# Patient Record
Sex: FEMALE | Race: WHITE | NOT HISPANIC OR LATINO | Employment: FULL TIME | URBAN - METROPOLITAN AREA
[De-identification: names, ages, dates, MRNs, and addresses within clinical notes are randomized per-mention and may not be internally consistent; named-entity substitution may affect disease eponyms.]

---

## 2020-05-06 ENCOUNTER — VIRTUAL VISIT (OUTPATIENT)
Dept: FAMILY MEDICINE | Facility: CLINIC | Age: 25
End: 2020-05-06
Payer: COMMERCIAL

## 2020-05-06 VITALS — HEIGHT: 67 IN | BODY MASS INDEX: 22.76 KG/M2 | WEIGHT: 145 LBS

## 2020-05-06 DIAGNOSIS — L70.9 ACNE, UNSPECIFIED ACNE TYPE: ICD-10-CM

## 2020-05-06 DIAGNOSIS — N92.6 IRREGULAR MENSES: Primary | ICD-10-CM

## 2020-05-06 DIAGNOSIS — L68.9 EXCESSIVE HAIR GROWTH: ICD-10-CM

## 2020-05-06 PROCEDURE — 99204 OFFICE O/P NEW MOD 45 MIN: CPT | Mod: 95 | Performed by: FAMILY MEDICINE

## 2020-05-06 RX ORDER — ETONOGESTREL AND ETHINYL ESTRADIOL VAGINAL RING .015; .12 MG/D; MG/D
RING VAGINAL
COMMUNITY
Start: 2020-04-19 | End: 2022-08-09

## 2020-05-06 ASSESSMENT — MIFFLIN-ST. JEOR: SCORE: 1440.35

## 2020-05-06 NOTE — PATIENT INSTRUCTIONS
Schedule fasting lab visit .        At [facility], we strive to deliver an exceptional experience to you, every time we see you. If you receive a survey, please complete it as we do value your feedback.  If you have MyChart, you can expect to receive results automatically within 24 hours of their completion.  Your provider will send a note interpreting your results as well.   If you do not have MyChart, you should receive your results in about a week by mail.    Your care team:                            Family Medicine Internal Medicine   MD Sameer Carlton MD Shantel Branch-Fleming, MD Katya Georgiev PA-C Megan Hill, APRN ADRIAN Flores, MD Pediatrics   NANCY Carrasco, MD Mayda Blackwell APRN CNP   MD Brandy Bruce MD Deborah Mielke, MD Kim Thein, APRN [facility]      Clinic hours: Monday - Thursday 7 am-7 pm; Fridays 7 am-5 pm.   Urgent care: Monday - Friday 11 am-9 pm; Saturday and Sunday 9 am-5 pm.    Clinic: [phone]       [facility]: Monday - Thursday 8 am - 7 pm; Friday 8 am - 6 pm  [facility]: [phone]     Use [email or web address] for 24/7 diagnosis and treatment of dozens of conditions.

## 2020-05-06 NOTE — PROGRESS NOTES
"Laura Nolan is a 25 year old female who is being evaluated via a billable telephone visit.      The patient has been notified of following:     \"This telephone visit will be conducted via a call between you and your physician/provider. We have found that certain health care needs can be provided without the need for a physical exam.  This service lets us provide the care you need with a short phone conversation.  If a prescription is necessary we can send it directly to your pharmacy.  If lab work is needed we can place an order for that and you can then stop by our lab to have the test done at a later time.    Telephone visits are billed at different rates depending on your insurance coverage. During this emergency period, for some insurers they may be billed the same as an in-person visit.  Please reach out to your insurance provider with any questions.    If during the course of the call the physician/provider feels a telephone visit is not appropriate, you will not be charged for this service.\"    Patient has given verbal consent for Telephone visit?  Yes    What phone number would you like to be contacted at? [phone]    How would you like to obtain your AVS? Declined     Subjective     Laura Nolan is a 25 year old female who presents to clinic today for the following health issues:    HPI  New Patient/Transfer of Care    Moved recently and need to establish primary care provider.     On ocp since HS but Was off for 6 monthsl ast year due to insurance.    No menses for 4-5 months when first stopped  Also noting other concerns.  More acne. Increased body/facial hair. Jaw line/neck hair   Thinks she had mostly regular cycles prior to starting ocp as teen  Started ocp due to heavy flow.    Not currently sexually active.     5/7'\", 145lbs  No pg in past.   No family history of gyn issues    Eat well, exercise regularly  Stress is high  No new vision changes   Headaches: noting occasional headache, " "mild brief. Resolve after drinking water/rest.  No new bowel changes.     Due for cpe. Thinks pap in last 5 years     Restarted nuvaring about 3 weeks ago.         There is no problem list on file for this patient.    History reviewed. No pertinent surgical history.    Social History     Tobacco Use     Smoking status: Never Smoker     Smokeless tobacco: Never Used   Substance Use Topics     Alcohol use: Yes     Family History   Problem Relation Age of Onset     Diabetes Paternal Grandmother          Current Outpatient Medications   Medication Sig Dispense Refill     etonogestrel-ethinyl estradiol (NUVARING) 0.12-0.015 MG/24HR vaginal ring        No Known Allergies  No lab results found.     Reviewed and updated as needed this visit by Provider         Review of Systems   ROS COMP: Constitutional, HEENT, cardiovascular, pulmonary, gi and gu systems are negative, except as otherwise noted.       Objective   Reported vitals:  Ht 1.702 m (5' 7\")   Wt 65.8 kg (145 lb)   BMI 22.71 kg/m     healthy, alert and no distress  PSYCH: Alert and oriented times 3; coherent speech, normal   rate and volume, able to articulate logical thoughts, able   to abstract reason, no tangential thoughts, no hallucinations   or delusions  Her affect is normal  RESP: No cough, no audible wheezing, able to talk in full sentences  Remainder of exam unable to be completed due to telephone visits    Diagnostic Test Results:  none         Assessment/Plan:  .     ICD-10-CM    1. Irregular menses  N92.6 etonogestrel-ethinyl estradiol (NUVARING) 0.12-0.015 MG/24HR vaginal ring     TSH with free T4 reflex     Glucose     DHEA sulfate     Prolactin     Testosterone total   2. Excessive hair growth  L68.9 etonogestrel-ethinyl estradiol (NUVARING) 0.12-0.015 MG/24HR vaginal ring     TSH with free T4 reflex     Glucose     DHEA sulfate     Prolactin     Testosterone total   3. Acne, unspecified acne type  L70.9 etonogestrel-ethinyl estradiol (NUVARING) " 0.12-0.015 MG/24HR vaginal ring     TSH with free T4 reflex     Glucose     DHEA sulfate     Prolactin     Testosterone total     Unclear if underlying metabolic d/o contributing  ?pcos vs other.   Will have her come in for labs (she is on hormonal contraception right now and ideally doesn't want to stop if not needed. Agree that we can r/o more serious issues, but pcos harder to dx without stopping meds. She is agreeable to get above labs to begin with  Will have her f/u in summer/fall for cpe and prn. She is able to get refills from past clinic for the nuvaring for now.     Return in about 3 months (around 8/6/2020) for Physical Exam.      Phone call duration:  22 minutes    Autumn Reeder MD

## 2020-05-11 ENCOUNTER — TELEPHONE (OUTPATIENT)
Dept: FAMILY MEDICINE | Facility: CLINIC | Age: 25
End: 2020-05-11

## 2020-05-11 NOTE — TELEPHONE ENCOUNTER
.Reason for call:  Other   Patient called regarding (reason for call): call back  Additional comments: pt wants to know how long she should fast for labs    Phone number to reach patient:  Home number on file [phone] (home)    Best Time:  anytime    Can we leave a detailed message on this number?  YES    Travel screening: Negative

## 2020-05-14 DIAGNOSIS — L70.9 ACNE, UNSPECIFIED ACNE TYPE: ICD-10-CM

## 2020-05-14 DIAGNOSIS — N92.6 IRREGULAR MENSES: ICD-10-CM

## 2020-05-14 DIAGNOSIS — L68.9 EXCESSIVE HAIR GROWTH: ICD-10-CM

## 2020-05-14 LAB
GLUCOSE SERPL-MCNC: 79 MG/DL (ref 70–99)
TSH SERPL DL<=0.005 MIU/L-ACNC: 1.21 MU/L (ref 0.4–4)

## 2020-05-14 PROCEDURE — 84146 ASSAY OF PROLACTIN: CPT | Performed by: FAMILY MEDICINE

## 2020-05-14 PROCEDURE — 82627 DEHYDROEPIANDROSTERONE: CPT | Performed by: FAMILY MEDICINE

## 2020-05-14 PROCEDURE — 84403 ASSAY OF TOTAL TESTOSTERONE: CPT | Performed by: FAMILY MEDICINE

## 2020-05-14 PROCEDURE — 36415 COLL VENOUS BLD VENIPUNCTURE: CPT | Performed by: FAMILY MEDICINE

## 2020-05-14 PROCEDURE — 82947 ASSAY GLUCOSE BLOOD QUANT: CPT | Performed by: FAMILY MEDICINE

## 2020-05-14 PROCEDURE — 84443 ASSAY THYROID STIM HORMONE: CPT | Performed by: FAMILY MEDICINE

## 2020-05-15 LAB — PROLACTIN SERPL-MCNC: 4 UG/L (ref 3–27)

## 2020-05-16 LAB — TESTOST SERPL-MCNC: 81 NG/DL (ref 8–60)

## 2020-05-18 DIAGNOSIS — N92.6 IRREGULAR MENSES: ICD-10-CM

## 2020-05-18 DIAGNOSIS — R79.89 ELEVATED TESTOSTERONE LEVEL IN FEMALE: Primary | ICD-10-CM

## 2020-05-18 LAB — DHEA-S SERPL-MCNC: 222 UG/DL (ref 35–430)

## 2020-05-19 ENCOUNTER — MYC MEDICAL ADVICE (OUTPATIENT)
Dept: FAMILY MEDICINE | Facility: CLINIC | Age: 25
End: 2020-05-19

## 2020-05-19 NOTE — TELEPHONE ENCOUNTER
Routing to provider to please advise. Please see [employer] message below.    Good Morning,     I received some test results back around hormone tests that were conducted last week and then today got a call about a referral about scheduling an endocrinology appointment. I can see that I am out of the normal ranges for some of the tests. Would someone be able to talk me through results or do I need to schedule a follow up appointment for that.     Thank you,     Laura Betancourt RN  [facility]

## 2020-05-19 NOTE — TELEPHONE ENCOUNTER
RECORDS RECEIVED FROM: Internal   DATE RECEIVED: 5.27.2020   NOTES (FOR ALL VISITS) STATUS DETAILS   OFFICE NOTES from referring provider Internal 5.19.2020  ESTER Reeder  5.6.2020   OFFICE NOTES from other specialist N/A    ED NOTES N/A    OPERATIVE REPORT  (thyroid, pituitary, adrenal, parathyroid) N/A    MEDICATION LIST Internal    IMAGING      DEXASCAN N/A    MRI (BRAIN) N/A    XR (Chest) N/A    CT (HEAD/NECK/CHEST/ABDOMEN) N/A    NUCLEAR  N/A    ULTRASOUND (HEAD/NECK) N/A    LABS     DIABETES: HBGA1C, CREATININE, FASTING LIPIDS, MICROALBUMIN URINE, POTASSIUM, TSH, T4    THYROID: TSH, T4, CBC, THYRODLONULIN, TOTAL T3, FREE T4, CALCITONIN, CEA Internal

## 2020-05-26 NOTE — PROGRESS NOTES
"Laura Nolan is a 25 year old female who is being evaluated via a billable telephone visit.      The patient has been notified of following:     \"This telephone visit will be conducted via a call between you and your physician/provider. We have found that certain health care needs can be provided without the need for a physical exam.  This service lets us provide the care you need with a short phone conversation.  If a prescription is necessary we can send it directly to your pharmacy.  If lab work is needed we can place an order for that and you can then stop by our lab to have the test done at a later time.    Telephone visits are billed at different rates depending on your insurance coverage. During this emergency period, for some insurers they may be billed the same as an in-person visit.  Please reach out to your insurance provider with any questions.    If during the course of the call the physician/provider feels a telephone visit is not appropriate, you will not be charged for this service.\"    Patient has given verbal consent for Telephone visit?  Yes    What phone number would you like to be contacted at? [phone]    How would you like to obtain your AVS? [facility]    Endocrinology Telephone/virtual Visit    Chief Complaint: Telephone (endocrine )     Information obtained from:Patient    Subjective:         HPI: Laura Nolan is a 25 year old female with history of irregular menses and more recently amenorrhea and hirsutism who is seen in consultation at Autumn Delacruz*'s request for the same.      This is a 25-year-old female patient who was recently evaluated at her primary care office for amenorrhea and hirsutism and work-up for this issues showed a testosterone level which was elevated and this is why she was sent to endocrinology clinic.      Menarche was at the age of 14 and diabetes age of 13-15 her menstrual cycle was irregular.  However between the age of [age]; she reports that " she had regular menses more or less.  Starting from the age of 18 she started hormonal contraceptive initially due to heavy menses.  She has been on hormone contraceptive pretty much constantly up until 5-month ago when she took herself off of hormonal contraception.  Following that her menses do not resume for a total of 5 months.  She also noted increased facial hair growth described as a couple of hairs on each jawline/on each side and also a couple of hairs on her neck and she also noted increased acne.  She reports that she has minimal hair involving her abdominal and chest area which is pretty much unchanged since stopping the oral contraceptive.  Again, the only hair changes she is noted is few hair on each side of her jawline and neck since stopping the oral contraceptive 5 months ago.    [place]  background and Jehovah's witness on one side of the family.  No family history of hirsutism or poor sick ovarian syndrome.  She  currently weighs about 145 pounds and there is no increase in her weight lately.  In fact, she reported that her weight has been stable more or less over the years.  No stigmata of Cushing's syndrome including diagnosis of diabetes or hypertension.  No new stretch marks.  She does not have any known past medical history.    Few weeks ago she started back hormonal contraception.  She is currently on NuvaRing.  After she has been on NuvaRing for about 3 weeks she had a blood draw for a total testosterone level which was documented below slightly elevated.         No Known Allergies    Current Outpatient Medications   Medication Sig Dispense Refill     etonogestrel-ethinyl estradiol (NUVARING) 0.12-0.015 MG/24HR vaginal ring          Review of Systems     11 point review system (Constitutional, HENT, Eyes, Respiratory, Cardiovascular, Gastrointestinal, Genitourinary, Musculoskeletal,Neurological, Psychiatric/Behavioural, Endocrine) is negative or is as per HPI above    No past medical history  on file.    Past Surgical History:   Procedure Laterality Date     C ORAL SURGERY PROCEDURE  age 16?    wisdom teeth       Family History   Problem Relation Age of Onset     Diabetes Paternal Grandmother        Social History     Socioeconomic History     Marital status: Single     Spouse name: Not on file     Number of children: Not on file     Years of education: Not on file     Highest education level: Not on file   Occupational History     Not on file   Social Needs     Financial resource strain: Not on file     Food insecurity     Worry: Not on file     Inability: Not on file     Transportation needs     Medical: Not on file     Non-medical: Not on file   Tobacco Use     Smoking status: Never Smoker     Smokeless tobacco: Never Used   Substance and Sexual Activity     Alcohol use: Yes     Comment: 4 drinks per week.      Drug use: Never     Sexual activity: Yes     Partners: Male   Lifestyle     Physical activity     Days per week: Not on file     Minutes per session: Not on file     Stress: Not on file   Relationships     Social connections     Talks on phone: Not on file     Gets together: Not on file     Attends Jehovah's witness service: Not on file     Active member of club or organization: Not on file     Attends meetings of clubs or organizations: Not on file     Relationship status: Not on file     Intimate partner violence     Fear of current or ex partner: Not on file     Emotionally abused: Not on file     Physically abused: Not on file     Forced sexual activity: Not on file   Other Topics Concern     Not on file   Social History Narrative     Not on file       Objective:     In House Labs:       TSH   Date Value Ref Range Status   05/14/2020 1.21 0.40 - 4.00 mU/L Final     Assessment/Treatment Plan:      Polycystic ovarian syndrome: Symptoms of irregular menses/amenorrhea and hyperandrogenism symptoms including hirsutism and acne.  No virilization.  Her symptoms based on the biochemical assessment supports  the diagnosis of polycystic ovarian syndrome.  Positive ovarian syndrome is a diagnosis of exclusion and DHEA-sulfate and total testosterone levels which are not significantly elevated rule out ovarian or adrenal pathologist.  Prolactin level was within the normal limits.  Congenital adrenal hyperplasia is in the differential diagnosis given her background and to rule out this 17 hydroxyprogesterone is ordered.  If 17 hydroxyprogesterone comes back within the normal limits; I think the hyperandrogenism symptoms, irregular menses and slightly elevated total testosterone level supports the diagnosis of polycystic ovarian syndrome.  Discussed treatment of positive ovarian syndrome and first-line therapy is hormonal contraception use.  Ideally oral contraceptive with low androgenic property progesterone however she is currently on NuvaRing; which has worked with in the past for her.  She has been on NuvaRing for so many years and the fact that her symptoms worsened up after she stopped this hormonal therapy also supports for secondary syndrome.  The testosterone level was checked after she was on the NuvaRing(about 3 weeks) therefore will repeat testosterone level to make sure that it is moving the right direction/or rather remains a stable.  The following patient education information was shared with the patient via [email or web address].    Patient Instructions     Patient Education     Polycystic Ovary Syndrome  Polycystic ovary syndrome (PCOS) causes harmless, small cysts in the ovaries and other symptoms. PCOS is caused by certain hormones being out of balance. The word  syndrome  means a group of symptoms. Women with PCOS may have no periods, irregular periods, or very long periods.  Your ovaries  Women store their eggs in their ovaries. Each egg is in a capsule called a follicle. Normally during the reproductive years, one follicle grows to produce a mature egg each month. This egg is released during ovulation and the follicle  dissolves.  Hormones out of balance  With polycystic ovary syndrome (PCOS), the hormones that control ovulation are out of balance. These include estrogen, progesterone, and androgen. As a result, ovulation may not occur. Instead, the follicle stays enlarged. This is a fluid-filled sac called a cyst. Over time, the ovaries fill with many small cysts. This is why they are called  poly  or many  cystic  ovaries. In some women, the ovaries also make too much androgen.  Women with PCOS may also have one or more of these symptoms:    Acne    Hair growth on the face and other parts of the body    Patches of thickened, velvety, darkened skin called acanthosis nigricans    Trouble getting pregnant (fertility problems)    Weight gain, especially around the waist       Women with PCOS are also at increased risk of developing cancer of the uterine lining (which is effectively prevented by hormonal contraception you are currently taking and diabetes(which is monitored closely).        PCOS is a diagnosis of exclusion and in order to make a diagnosis; other medical conditions which cause similar symptoms have to be ruled out. With this in mind,  I have ordered 17 hydroxyprogesterone to rule out a condition known as congenital adrenal hyperplasia.      I will contact you after the blood work results.  In the meantime if you have any question please do not hesitate to contact me.      I will contact the patient with the test results.  Return to clinic in 3-4 months.    Test and/or medications prescribed today:  Orders Placed This Encounter   Procedures     Testosterone total     17 OH progesterone     Hemoglobin A1c         Blanche Mi MD  Staff Endocrinologist    [phone]  Division of Endocrinology and Diabetes          Phone call duration: 28 minutes (Consult level IV)

## 2020-05-27 ENCOUNTER — PRE VISIT (OUTPATIENT)
Dept: ENDOCRINOLOGY | Facility: CLINIC | Age: 25
End: 2020-05-27

## 2020-05-27 ENCOUNTER — VIRTUAL VISIT (OUTPATIENT)
Dept: ENDOCRINOLOGY | Facility: CLINIC | Age: 25
End: 2020-05-27
Payer: COMMERCIAL

## 2020-05-27 DIAGNOSIS — L68.0 HIRSUTISM: ICD-10-CM

## 2020-05-27 DIAGNOSIS — E28.2 PCOS (POLYCYSTIC OVARIAN SYNDROME): Primary | ICD-10-CM

## 2020-05-27 DIAGNOSIS — N92.6 IRREGULAR MENSTRUAL CYCLE: ICD-10-CM

## 2020-05-27 NOTE — LETTER
"5/27/2020       RE: Laura Nolan  [address] [facility] [address]     Dear Colleague,    Thank you for referring your patient, Laura Nolan, to the [facility] ENDOCRINOLOGY at [facility]. Please see a copy of my visit note below.    Laura Nolan is a 25 year old female who is being evaluated via a billable telephone visit.      The patient has been notified of following:     \"This telephone visit will be conducted via a call between you and your physician/provider. We have found that certain health care needs can be provided without the need for a physical exam.  This service lets us provide the care you need with a short phone conversation.  If a prescription is necessary we can send it directly to your pharmacy.  If lab work is needed we can place an order for that and you can then stop by our lab to have the test done at a later time.    Telephone visits are billed at different rates depending on your insurance coverage. During this emergency period, for some insurers they may be billed the same as an in-person visit.  Please reach out to your insurance provider with any questions.    If during the course of the call the physician/provider feels a telephone visit is not appropriate, you will not be charged for this service.\"    Patient has given verbal consent for Telephone visit?  Yes    What phone number would you like to be contacted at? [phone]    How would you like to obtain your AVS? [facility]    Endocrinology Telephone/virtual Visit    Chief Complaint: Telephone (endocrine )     Information obtained from:Patient    Subjective:         HPI: Laura Nolan is a 25 year old female with history of irregular menses and more recently amenorrhea and hirsutism who is seen in consultation at Autumn Delacruz*'s request for the same.      This is a 25-year-old female patient who was recently evaluated at her primary care office for amenorrhea and " hirsutism and work-up for this issues showed a testosterone level which was elevated and this is why she was sent to endocrinology clinic.      Menarche was at the age of 14 and diabetes age of 13-15 her menstrual cycle was irregular.  However between the age of [age]; she reports that she had regular menses more or less.  Starting from the age of 18 she started hormonal contraceptive initially due to heavy menses.  She has been on hormone contraceptive pretty much constantly up until 5-month ago when she took herself off of hormonal contraception.  Following that her menses do not resume for a total of 5 months.  She also noted increased facial hair growth described as a couple of hairs on each jawline/on each side and also a couple of hairs on her neck and she also noted increased acne.  She reports that she has minimal hair involving her abdominal and chest area which is pretty much unchanged since stopping the oral contraceptive.  Again, the only hair changes she is noted is few hair on each side of her jawline and neck since stopping the oral contraceptive 5 months ago.    Eastern  background and Baptism on one side of the family.  No family history of hirsutism or poor sick ovarian syndrome.  She  currently weighs about 145 pounds and there is no increase in her weight lately.  In fact, she reported that her weight has been stable more or less over the years.  No stigmata of Cushing's syndrome including diagnosis of diabetes or hypertension.  No new stretch marks.  She does not have any known past medical history.    Few weeks ago she started back hormonal contraception.  She is currently on NuvaRing.  After she has been on NuvaRing for about 3 weeks she had a blood draw for a total testosterone level which was documented below slightly elevated.         No Known Allergies    Current Outpatient Medications   Medication Sig Dispense Refill     etonogestrel-ethinyl estradiol (NUVARING) 0.12-0.015 MG/24HR  vaginal ring          Review of Systems     11 point review system (Constitutional, HENT, Eyes, Respiratory, Cardiovascular, Gastrointestinal, Genitourinary, Musculoskeletal,Neurological, Psychiatric/Behavioural, Endocrine) is negative or is as per HPI above    No past medical history on file.    Past Surgical History:   Procedure Laterality Date     C ORAL SURGERY PROCEDURE  age 16?    wisdom teeth       Family History   Problem Relation Age of Onset     Diabetes Paternal Grandmother        Social History     Socioeconomic History     Marital status: Single     Spouse name: Not on file     Number of children: Not on file     Years of education: Not on file     Highest education level: Not on file   Occupational History     Not on file   Social Needs     Financial resource strain: Not on file     Food insecurity     Worry: Not on file     Inability: Not on file     Transportation needs     Medical: Not on file     Non-medical: Not on file   Tobacco Use     Smoking status: Never Smoker     Smokeless tobacco: Never Used   Substance and Sexual Activity     Alcohol use: Yes     Comment: 4 drinks per week.      Drug use: Never     Sexual activity: Yes     Partners: Male   Lifestyle     Physical activity     Days per week: Not on file     Minutes per session: Not on file     Stress: Not on file   Relationships     Social connections     Talks on phone: Not on file     Gets together: Not on file     Attends [employer] service: Not on file     Active member of club or organization: Not on file     Attends meetings of clubs or organizations: Not on file     Relationship status: Not on file     Intimate partner violence     Fear of current or ex partner: Not on file     Emotionally abused: Not on file     Physically abused: Not on file     Forced sexual activity: Not on file   Other Topics Concern     Not on file   Social History Narrative     Not on file       Objective:     In House Labs:       TSH   Date Value Ref Range  Status   05/14/2020 1.21 0.40 - 4.00 mU/L Final     Assessment/Treatment Plan:      Polycystic ovarian syndrome: Symptoms of irregular menses/amenorrhea and hyperandrogenism symptoms including hirsutism and acne.  No virilization.  Her symptoms based on the biochemical assessment supports the diagnosis of polycystic ovarian syndrome.  Positive ovarian syndrome is a diagnosis of exclusion and DHEA-sulfate and total testosterone levels which are not significantly elevated rule out ovarian or adrenal pathologist.  Prolactin level was within the normal limits.  Congenital adrenal hyperplasia is in the differential diagnosis given her background and to rule out this 17 hydroxyprogesterone is ordered.  If 17 hydroxyprogesterone comes back within the normal limits; I think the hyperandrogenism symptoms, irregular menses and slightly elevated total testosterone level supports the diagnosis of polycystic ovarian syndrome.  Discussed treatment of positive ovarian syndrome and first-line therapy is hormonal contraception use.  Ideally oral contraceptive with low androgenic property progesterone however she is currently on NuvaRing; which has worked with in the past for her.  She has been on NuvaRing for so many years and the fact that her symptoms worsened up after she stopped this hormonal therapy also supports for secondary syndrome.  The testosterone level was checked after she was on the NuvaRing(about 3 weeks) therefore will repeat testosterone level to make sure that it is moving the right direction/or rather remains a stable.  The following patient education information was shared with the patient via [employer].    Patient Instructions     Patient Education     Polycystic Ovary Syndrome  Polycystic ovary syndrome (PCOS) causes harmless, small cysts in the ovaries and other symptoms. PCOS is caused by certain hormones being out of balance. The word  syndrome  means a group of symptoms. Women with PCOS may have no periods,  irregular periods, or very long periods.  Your ovaries  Women store their eggs in their ovaries. Each egg is in a capsule called a follicle. Normally during the reproductive years, one follicle grows to produce a mature egg each month. This egg is released during ovulation and the follicle dissolves.  Hormones out of balance  With polycystic ovary syndrome (PCOS), the hormones that control ovulation are out of balance. These include estrogen, progesterone, and androgen. As a result, ovulation may not occur. Instead, the follicle stays enlarged. This is a fluid-filled sac called a cyst. Over time, the ovaries fill with many small cysts. This is why they are called  poly  or many  cystic  ovaries. In some women, the ovaries also make too much androgen.  Women with PCOS may also have one or more of these symptoms:    Acne    Hair growth on the face and other parts of the body    Patches of thickened, velvety, darkened skin called acanthosis nigricans    Trouble getting pregnant (fertility problems)    Weight gain, especially around the waist       Women with PCOS are also at increased risk of developing cancer of the uterine lining (which is effectively prevented by hormonal contraception you are currently taking and diabetes(which is monitored closely).        PCOS is a diagnosis of exclusion and in order to make a diagnosis; other medical conditions which cause similar symptoms have to be ruled out. With this in mind,  I have ordered 17 hydroxyprogesterone to rule out a condition known as congenital adrenal hyperplasia.      I will contact you after the blood work results.  In the meantime if you have any question please do not hesitate to contact me.      I will contact the patient with the test results.  Return to clinic in 3-4 months.    Test and/or medications prescribed today:  Orders Placed This Encounter   Procedures     Testosterone total     17 OH progesterone     Hemoglobin A1c         Blanche Mi  MD  Staff Endocrinologist    [phone]  Division of Endocrinology and Diabetes          Phone call duration: 28 minutes (Consult level IV)        Again, thank you for allowing me to participate in the care of your patient.      Sincerely,    Blanche Mi MD

## 2020-05-27 NOTE — PATIENT INSTRUCTIONS
Patient Education     Polycystic Ovary Syndrome  Polycystic ovary syndrome (PCOS) causes harmless, small cysts in the ovaries and other symptoms. PCOS is caused by certain hormones being out of balance. The word  syndrome  means a group of symptoms. Women with PCOS may have no periods, irregular periods, or very long periods.  Your ovaries  Women store their eggs in their ovaries. Each egg is in a capsule called a follicle. Normally during the reproductive years, one follicle grows to produce a mature egg each month. This egg is released during ovulation and the follicle dissolves.  Hormones out of balance  With polycystic ovary syndrome (PCOS), the hormones that control ovulation are out of balance. These include estrogen, progesterone, and androgen. As a result, ovulation may not occur. Instead, the follicle stays enlarged. This is a fluid-filled sac called a cyst. Over time, the ovaries fill with many small cysts. This is why they are called  poly  or many  cystic  ovaries. In some women, the ovaries also make too much androgen.  Women with PCOS may also have one or more of these symptoms:    Acne    Hair growth on the face and other parts of the body    Patches of thickened, velvety, darkened skin called acanthosis nigricans    Trouble getting pregnant (fertility problems)    Weight gain, especially around the waist       Women with PCOS are also at increased risk of developing cancer of the uterine lining (which is effectively prevented by hormonal contraception you are currently taking and diabetes(which is monitored closely).        PCOS is a diagnosis of exclusion and in order to make a diagnosis; other medical conditions which cause similar symptoms have to be ruled out. With this in mind,  I have ordered 17 hydroxyprogesterone to rule out a condition known as congenital adrenal hyperplasia.      I will contact you after the blood work results.  In the meantime if you have any question please do not  hesitate to contact me.

## 2020-09-04 ENCOUNTER — MYC MEDICAL ADVICE (OUTPATIENT)
Dept: FAMILY MEDICINE | Facility: CLINIC | Age: 25
End: 2020-09-04

## 2020-09-23 ASSESSMENT — ENCOUNTER SYMPTOMS
FEVER: 0
DIARRHEA: 0
PARESTHESIAS: 0
ARTHRALGIAS: 0
MYALGIAS: 0
BREAST MASS: 0
JOINT SWELLING: 0
SHORTNESS OF BREATH: 0
EYE PAIN: 0
HEMATOCHEZIA: 0
WEAKNESS: 0
NAUSEA: 0
ABDOMINAL PAIN: 0
NERVOUS/ANXIOUS: 1
HEADACHES: 0
FREQUENCY: 0
CHILLS: 0
CONSTIPATION: 0
HEARTBURN: 0
SORE THROAT: 0
COUGH: 0
HEMATURIA: 0
PALPITATIONS: 0
DYSURIA: 0
DIZZINESS: 0

## 2020-09-24 ENCOUNTER — OFFICE VISIT (OUTPATIENT)
Dept: FAMILY MEDICINE | Facility: CLINIC | Age: 25
End: 2020-09-24
Payer: COMMERCIAL

## 2020-09-24 VITALS
SYSTOLIC BLOOD PRESSURE: 106 MMHG | TEMPERATURE: 98.1 F | HEART RATE: 65 BPM | OXYGEN SATURATION: 100 % | DIASTOLIC BLOOD PRESSURE: 64 MMHG | BODY MASS INDEX: 23.49 KG/M2 | WEIGHT: 150 LBS

## 2020-09-24 DIAGNOSIS — Z11.3 SCREENING FOR STDS (SEXUALLY TRANSMITTED DISEASES): ICD-10-CM

## 2020-09-24 DIAGNOSIS — Z30.44 ENCOUNTER FOR SURVEILLANCE OF VAGINAL RING HORMONAL CONTRACEPTIVE DEVICE: ICD-10-CM

## 2020-09-24 DIAGNOSIS — Z11.4 SCREENING FOR HIV (HUMAN IMMUNODEFICIENCY VIRUS): ICD-10-CM

## 2020-09-24 DIAGNOSIS — Z00.00 ROUTINE GENERAL MEDICAL EXAMINATION AT A HEALTH CARE FACILITY: Primary | ICD-10-CM

## 2020-09-24 DIAGNOSIS — E28.2 PCOS (POLYCYSTIC OVARIAN SYNDROME): ICD-10-CM

## 2020-09-24 DIAGNOSIS — Z12.4 SCREENING FOR MALIGNANT NEOPLASM OF CERVIX: ICD-10-CM

## 2020-09-24 DIAGNOSIS — N92.6 IRREGULAR MENSES: ICD-10-CM

## 2020-09-24 DIAGNOSIS — L70.9 ACNE, UNSPECIFIED ACNE TYPE: ICD-10-CM

## 2020-09-24 DIAGNOSIS — N92.6 IRREGULAR MENSTRUAL CYCLE: ICD-10-CM

## 2020-09-24 DIAGNOSIS — L68.0 HIRSUTISM: ICD-10-CM

## 2020-09-24 DIAGNOSIS — L68.9 EXCESSIVE HAIR GROWTH: ICD-10-CM

## 2020-09-24 LAB — HBA1C MFR BLD: 4.9 % (ref 0–5.6)

## 2020-09-24 PROCEDURE — G0145 SCR C/V CYTO,THINLAYER,RESCR: HCPCS | Performed by: FAMILY MEDICINE

## 2020-09-24 PROCEDURE — 36415 COLL VENOUS BLD VENIPUNCTURE: CPT | Performed by: FAMILY MEDICINE

## 2020-09-24 PROCEDURE — 83036 HEMOGLOBIN GLYCOSYLATED A1C: CPT | Performed by: FAMILY MEDICINE

## 2020-09-24 PROCEDURE — 83498 ASY HYDROXYPROGESTERONE 17-D: CPT | Performed by: FAMILY MEDICINE

## 2020-09-24 PROCEDURE — 87591 N.GONORRHOEAE DNA AMP PROB: CPT | Performed by: FAMILY MEDICINE

## 2020-09-24 PROCEDURE — 84403 ASSAY OF TOTAL TESTOSTERONE: CPT | Performed by: FAMILY MEDICINE

## 2020-09-24 PROCEDURE — 99395 PREV VISIT EST AGE 18-39: CPT | Mod: 25 | Performed by: FAMILY MEDICINE

## 2020-09-24 PROCEDURE — 90472 IMMUNIZATION ADMIN EACH ADD: CPT | Performed by: FAMILY MEDICINE

## 2020-09-24 PROCEDURE — 90715 TDAP VACCINE 7 YRS/> IM: CPT | Performed by: FAMILY MEDICINE

## 2020-09-24 PROCEDURE — 90471 IMMUNIZATION ADMIN: CPT | Performed by: FAMILY MEDICINE

## 2020-09-24 PROCEDURE — 90686 IIV4 VACC NO PRSV 0.5 ML IM: CPT | Performed by: FAMILY MEDICINE

## 2020-09-24 PROCEDURE — 87389 HIV-1 AG W/HIV-1&-2 AB AG IA: CPT | Performed by: FAMILY MEDICINE

## 2020-09-24 PROCEDURE — 87491 CHLMYD TRACH DNA AMP PROBE: CPT | Performed by: FAMILY MEDICINE

## 2020-09-24 RX ORDER — ETONOGESTREL AND ETHINYL ESTRADIOL VAGINAL RING .015; .12 MG/D; MG/D
1 RING VAGINAL
Qty: 3 EACH | Refills: 3 | Status: SHIPPED | OUTPATIENT
Start: 2020-09-24 | End: 2021-09-28

## 2020-09-24 ASSESSMENT — ENCOUNTER SYMPTOMS
SHORTNESS OF BREATH: 0
HEMATURIA: 0
NAUSEA: 0
MYALGIAS: 0
PARESTHESIAS: 0
NERVOUS/ANXIOUS: 1
FEVER: 0
HEADACHES: 0
ABDOMINAL PAIN: 0
HEARTBURN: 0
CONSTIPATION: 0
ARTHRALGIAS: 0
CHILLS: 0
JOINT SWELLING: 0
COUGH: 0
HEMATOCHEZIA: 0
DIZZINESS: 0
WEAKNESS: 0
DYSURIA: 0
SORE THROAT: 0
BREAST MASS: 0
PALPITATIONS: 0
DIARRHEA: 0
EYE PAIN: 0
FREQUENCY: 0

## 2020-09-24 NOTE — PROGRESS NOTES
SUBJECTIVE:   CC: Laura Nolan is an 25 year old woman who presents for preventive health visit.     Healthy Habits:     Getting at least 3 servings of Calcium per day:  Yes    Bi-annual eye exam:  NO    Dental care twice a year:  Yes    Sleep apnea or symptoms of sleep apnea:  None    Diet:  Regular (no restrictions)    Frequency of exercise:  4-5 days/week    Duration of exercise:  30-45 minutes    Taking medications regularly:  No    Barriers to taking medications:  None    Medication side effects:  None    PHQ-2 Total Score: 1    Additional concerns today:  Yes    Getting w/u for pcos with endocrinology and has pending orders that still need to be drawn..   On nuvaring currently  Has been managing her acne better and is no longer so concerned about.  Still abnormal body hair but managing it.     Interested in implant or IUD for contraception    Declines overt depression or anxiety right now. Just covid stress.  Feels she has good resources to reach out to when her stress is high.    Today's PHQ-2 Score:   PHQ-2 ( 1999 Pfizer) 9/23/2020   Q1: Little interest or pleasure in doing things 1   Q2: Feeling down, depressed or hopeless 0   PHQ-2 Score 1   Q1: Little interest or pleasure in doing things Several days   Q2: Feeling down, depressed or hopeless Not at all   PHQ-2 Score 1         Abuse: Current or Past (Physical, Sexual or Emotional) - No  Do you feel safe in your environment? Yes    Reports last pap was 2018    Social History     Tobacco Use     Smoking status: Never Smoker     Smokeless tobacco: Never Used   Substance Use Topics     Alcohol use: Yes     Comment: 4 drinks per week.      If you drink alcohol do you typically have >3 drinks per day or >7 drinks per week? No    Alcohol Use 9/23/2020   Prescreen: >3 drinks/day or >7 drinks/week? No       Reviewed orders with patient.  Reviewed health maintenance and updated orders accordingly - Yes  There is no problem list on file for this patient.     Past Surgical History:   Procedure Laterality Date     C ORAL SURGERY PROCEDURE  age 16?    wisdom teeth       Social History     Tobacco Use     Smoking status: Never Smoker     Smokeless tobacco: Never Used   Substance Use Topics     Alcohol use: Yes     Comment: 4 drinks per week.      Family History   Problem Relation Age of Onset     Diabetes Paternal Grandmother          Current Outpatient Medications   Medication Sig Dispense Refill     etonogestrel-ethinyl estradiol (NUVARING) 0.12-0.015 MG/24HR vaginal ring Place 1 each vaginally every 28 days 3 each 3     etonogestrel-ethinyl estradiol (NUVARING) 0.12-0.015 MG/24HR vaginal ring        No Known Allergies    Mammogram not appropriate for this patient based on age.    Pertinent mammograms are reviewed under the imaging tab.  History of abnormal Pap smear: NO - age 21-29 PAP every 3 years recommended     Reviewed and updated as needed this visit by clinical staff  Tobacco  Allergies  Meds  Problems  Med Hx  Surg Hx  Fam Hx         Reviewed and updated as needed this visit by Provider  Tobacco  Allergies  Meds  Problems  Med Hx  Surg Hx  Fam Hx            Review of Systems   Constitutional: Negative for chills and fever.   HENT: Negative for congestion, ear pain, hearing loss and sore throat.    Eyes: Negative for pain and visual disturbance.   Respiratory: Negative for cough and shortness of breath.    Cardiovascular: Negative for chest pain, palpitations and peripheral edema.   Gastrointestinal: Negative for abdominal pain, constipation, diarrhea, heartburn, hematochezia and nausea.   Breasts:  Negative for tenderness, breast mass and discharge.   Genitourinary: Negative for dysuria, frequency, genital sores, hematuria, pelvic pain, urgency, vaginal bleeding and vaginal discharge.   Musculoskeletal: Negative for arthralgias, joint swelling and myalgias.   Skin: Negative for rash.   Neurological: Negative for dizziness, weakness, headaches and  paresthesias.   Psychiatric/Behavioral: Negative for mood changes. The patient is nervous/anxious.       ROS: 10 point ROS neg other than the symptoms noted above in the HPI.       OBJECTIVE:   /64 (BP Location: Right arm, Patient Position: Chair, Cuff Size: Adult Regular)   Pulse 65   Temp 98.1  F (36.7  C) (Oral)   Wt 68 kg (150 lb)   LMP 09/14/2020 (Approximate)   SpO2 100%   Breastfeeding No   BMI 23.49 kg/m    Physical Exam  GENERAL: healthy, alert and no distress  EYES: Eyes grossly normal to inspection, PERRL and conjunctivae and sclerae normal  HENT: ear canals and TM's normal, nose and mouth without ulcers or lesions  NECK: no adenopathy, no asymmetry, masses, or scars and thyroid normal to palpation  RESP: lungs clear to auscultation - no rales, rhonchi or wheezes  BREAST: normal without masses, tenderness or nipple discharge and no palpable axillary masses or adenopathy  CV: regular rate and rhythm, normal S1 S2, no S3 or S4, no murmur, click or rub, no peripheral edema and peripheral pulses strong  ABDOMEN: soft, nontender, no hepatosplenomegaly, no masses and bowel sounds normal   (female): normal female external genitalia, normal urethral meatus, vaginal mucosa pink, moist, well rugated, and normal cervix/adnexa/uterus without masses or discharge  MS: no gross musculoskeletal defects noted, no edema  SKIN: no suspicious lesions or rashes  NEURO: Normal strength and tone, mentation intact and speech normal  PSYCH: mentation appears normal, affect normal/bright    Diagnostic Test Results:  Labs reviewed in Epic    ASSESSMENT/PLAN:   1. Routine general medical examination at a health care facility  Patient is able to pull up her past MyChart account from her clinic in [place] and she is due for a tetanus vaccine which we will update today.  She has completed all 3 HPV vaccines    2. Encounter for surveillance of vaginal ring hormonal contraceptive device  Reviewed contraception options  "and she would like to think about these for a bit prior to scheduling for IUD or Nexplanon.  For now we will continue NuvaRing.  - etonogestrel-ethinyl estradiol (NUVARING) 0.12-0.015 MG/24HR vaginal ring; Place 1 each vaginally every 28 days  Dispense: 3 each; Refill: 3    3. Screening for STDs (sexually transmitted diseases)  Currently not sexually active and no current symptoms but does agree to screening.  - NEISSERIA GONORRHOEA PCR  - CHLAMYDIA TRACHOMATIS PCR    4. Screening for HIV (human immunodeficiency virus)  Does not think she is ever been screened in the past but feels she is very low risk and she uses condoms.  - **HIV Antigen Antibody Combo FUTURE anytime; Future    5. Screening for malignant neoplasm of cervix  - Pap imaged thin layer screen only - recommended age 21 - 24 years    6. Irregular menses  7. Excessive hair growth  8. Acne, unspecified acne type  Probable PCOS.  Will get additional labs today ordered by endocrine        COUNSELING:  Reviewed preventive health counseling, as reflected in patient instructions       Regular exercise       Healthy diet/nutrition       Contraception       Safe sex practices/STD prevention    Estimated body mass index is 23.49 kg/m  as calculated from the following:    Height as of 5/6/20: 1.702 m (5' 7\").    Weight as of this encounter: 68 kg (150 lb).        She reports that she has never smoked. She has never used smokeless tobacco.      Counseling Resources:  ATP IV Guidelines  Pooled Cohorts Equation Calculator  Breast Cancer Risk Calculator  BRCA-Related Cancer Risk Assessment: FHS-7 Tool  FRAX Risk Assessment  ICSI Preventive Guidelines  Dietary Guidelines for Americans, 2010  USDA's MyPlate  ASA Prophylaxis  Lung CA Screening    Autumn Reeder MD  [facility]  "

## 2020-09-24 NOTE — PATIENT INSTRUCTIONS
Schedule office visit for placement of IUD or Nexplanon       Preventive Health Recommendations  Female Ages 21 to 25     Yearly exam:     See your health care provider every year in order to  o Review health changes.   o Discuss preventive care.    o Review your medicines if your doctor has prescribed any.      You should be tested each year for STDs (sexually transmitted diseases).       Talk to your provider about how often you should have cholesterol testing.      Get a Pap test every three years. If you have an abnormal result, your doctor may have you test more often.      If you are at risk for diabetes, you should have a diabetes test (fasting glucose).     Shots:     Get a flu shot each year.     Get a tetanus shot every 10 years.     Consider getting the shot (vaccine) that prevents cervical cancer (Gardasil).    Nutrition:     Eat at least 5 servings of fruits and vegetables each day.    Eat whole-grain bread, whole-wheat pasta and brown rice instead of white grains and rice.    Get adequate Calcium and Vitamin D.     Lifestyle    Exercise at least 150 minutes a week each week (30 minutes a day, 5 days a week). This will help you control your weight and prevent disease.    Limit alcohol to one drink per day.    No smoking.     Wear sunscreen to prevent skin cancer.    See your dentist every six months for an exam and cleaning.

## 2020-09-24 NOTE — NURSING NOTE
Prior to immunization administration, verified patients identity using patient s name and date of birth. Please see Immunization Activity for additional information.     Screening Questionnaire for Adult Immunization    Are you sick today?   No   Do you have allergies to medications, food, a vaccine component or latex?   No   Have you ever had a serious reaction after receiving a vaccination?   No   Do you have a long-term health problem with heart, lung, kidney, or metabolic disease (e.g., diabetes), asthma, a blood disorder, no spleen, complement component deficiency, a cochlear implant, or a spinal fluid leak?  Are you on long-term aspirin therapy?   No   Do you have cancer, leukemia, HIV/AIDS, or any other immune system problem?   No   Do you have a parent, brother, or sister with an immune system problem?   No   In the past 3 months, have you taken medications that affect  your immune system, such as prednisone, other steroids, or anticancer drugs; drugs for the treatment of rheumatoid arthritis, Crohn s disease, or psoriasis; or have you had radiation treatments?   No   Have you had a seizure, or a brain or other nervous system problem?   No   During the past year, have you received a transfusion of blood or blood    products, or been given immune (gamma) globulin or antiviral drug?   No   For women: Are you pregnant or is there a chance you could become       pregnant during the next month?   No   Have you received any vaccinations in the past 4 weeks?   No     Immunization questionnaire answers were all negative.        Per orders of Dr. Reeder, injection of Tdap and Flu given by Елена Delatorre MA. Patient instructed to remain in clinic for 15 minutes afterwards, and to report any adverse reaction to me immediately.       Screening performed by Елена Delatorre MA on 9/24/2020 at 3:03 PM.

## 2020-09-25 LAB
C TRACH DNA SPEC QL NAA+PROBE: NEGATIVE
HIV 1+2 AB+HIV1 P24 AG SERPL QL IA: NONREACTIVE
N GONORRHOEA DNA SPEC QL NAA+PROBE: NEGATIVE
SPECIMEN SOURCE: NORMAL
SPECIMEN SOURCE: NORMAL

## 2020-09-26 LAB — TESTOST SERPL-MCNC: 48 NG/DL (ref 8–60)

## 2020-09-28 LAB — 17OHP SERPL-MCNC: 29 NG/DL

## 2020-09-29 ENCOUNTER — MYC MEDICAL ADVICE (OUTPATIENT)
Dept: FAMILY MEDICINE | Facility: CLINIC | Age: 25
End: 2020-09-29

## 2020-09-29 LAB
COPATH REPORT: NORMAL
PAP: NORMAL

## 2020-09-30 NOTE — RESULT ENCOUNTER NOTE
Laura,     Tammie please find blood work results.     The testosterone, hemoglobin A1c and 17 hydroxyprogesterone and within the normal range.  Please continue current therapy for polycystic ovarian syndrome.  Please let me know if you have any questions.    Blanche Mi MD

## 2020-12-31 ENCOUNTER — MYC MEDICAL ADVICE (OUTPATIENT)
Dept: FAMILY MEDICINE | Facility: CLINIC | Age: 25
End: 2020-12-31

## 2021-02-08 ENCOUNTER — HOSPITAL ENCOUNTER (OUTPATIENT)
Dept: BEHAVIORAL HEALTH | Facility: CLINIC | Age: 26
Discharge: HOME | End: 2021-02-08
Attending: FAMILY MEDICINE | Admitting: FAMILY MEDICINE
Payer: COMMERCIAL

## 2021-02-08 PROCEDURE — 90791 PSYCH DIAGNOSTIC EVALUATION: CPT | Mod: GT | Performed by: COUNSELOR

## 2021-02-08 NOTE — PROGRESS NOTES
"[facility]  Provider Name:  Deana Ramires, JANES, [facility], [facility] [place]    PATIENT'S NAME: Laura Nolan  PREFERRED NAME: Laura  PRONOUNS:     She/her  MRN: [id number]  : 1995  ADDRESS: [address]  [address] [place] [id number]   [facility]  [id number]  DATE OF SERVICE: 21  START TIME: 4:01pm  END TIME: 4:51pm  PREFERRED PHONE:  [phone]   [email or web address]  - will send support group links.   May we leave a program related message: Yes  SERVICE MODALITY:  Video Visit:      Provider verified identity through the following two step process.  Patient provided:  Patient address    Telemedicine Visit: The patient's condition can be safely assessed and treated via synchronous audio and visual telemedicine encounter.      Reason for Telemedicine Visit: Services only offered telehealth    Originating Site (Patient Location): Patient's home    Distant Site (Provider Location): Provider Remote Setting    Consent:  The patient/guardian has verbally consented to: the potential risks and benefits of telemedicine (video visit) versus in person care; bill my insurance or make self-payment for services provided; and responsibility for payment of non-covered services.     Patient would like the video invitation sent by:  Send to e-mail at: [email or web address]    Mode of Communication:  Video Conference via Amwell    As the provider I attest to compliance with applicable laws and regulations related to telemedicine.    UNIVERSAL ADULT Mental Health DIAGNOSTIC ASSESSMENT    Identifying Information:  Patient is a 26 year old.  The pronoun use throughout this assessment reflects the patient's chosen pronoun. Patient was referred for an assessment by self.  Patient attended the session alone.     Chief Complaint:   The reason for seeking services at this time is: \"I have not been managing mental health the way I want to.\" She has been feeling this way for the past " 4 months. She worked with a therapist 6 years ago for circumstantial issues. This has happened on and off for a couple of years. Her current feelings started about 5 months ago and this is the longest stretch of time that she hasn't been able to manage it on her own. She sleeps okay and tries to keep herself on a schedule because she used to oversleep. She has been losing hope and losing direction. She doesn't know why she is doing things and is not feeling purpose. She is afraid she will wake up in several years and not be living the life she wants. She doesn't know what she wants out of her future. She has talked with family about this. Her mother is supportive, but her parents don't have the answers. She feels unhappy. She his trying to do all the right things - exercising, running, Vitamin D, sunlamp, pursue new hobbies - but doesn't feel better. She thinks she will have this existential crisis even with medications.       Social/Family History:  Patient reported they grew up in [place], [place]. Her parents remain . She has one older brother - 2.5 years older. Patient reported that their childhood was good, great childhood, parents are awesome. Patient denied all forms of abuse. Patient describes current relationships with family of origin as: her main support systems.        The patient describes their cultural background as White and Episcopal.  Cultural influences and impact on patient's life structure, values, norms, and healthcare: Racial or Ethnic Self-Identification.  Contextual influences on patient's health include: None. Patient identified their preferred language to be English. Patient reported they does not need the assistance of an  or other support involved in therapy.     Patient reported had no significant delays in developmental tasks. Patient's highest education level was college graduate. Patient identified the following learning problems: none reported. Modifications will not be  used to assist communication in therapy. Patient reports they are able to understand written materials.    Patient's current relationship status is partnered / significant other for about 6 months. Patient identified their sexual orientation as bi-sexual.  Patient reported having zero child(se).      Patient lives with alone in an apartment. Housing is stable. Patient identified parents and brother as part of their support system.  Patient identified the quality of these relationships as good.     Patient works full-time in [employer] for [employer]. She works from home most days. Patient reports their finances are obtained through employment.  Patient does not identify finances as a current stressor.      Patient reported that they have not been involved with the legal system. Patient denies being on probation / parole / under the jurisdiction of the court.    Patient's Strengths and Limitations:  Patient identified the following strengths or resources that will help them succeed in treatment: commitment to health and well being, insight, intelligence and work ethic. Things that may interfere with the patient's success in treatment include: none identified.     Personal and Family Medical History:   Patient does report a family history of mental health concerns. There might be depression on her mother's side. Patient reports family history includes Diabetes in her paternal grandmother..     Patient reported the following previous diagnoses which include(s): Depression. Patient reported symptoms began for the past few years.  Patient has received mental health services in the past: therapy about 6 years ago.  Psychiatric Hospitalizations: None.  Patient denies a history of civil commitment.  Currently, patient is not receiving other mental health services.     Patient has had a physical exam to rule out medical causes for current symptoms. Date of last physical exam was within the past year. The patient has a [facility]  Primary Care Provider, who is named Autumn Reeder. About 5 months ago, her doctor checked some blood levels and she was diagnosed with polycystic ovarian syndrome. Patient stated for a while it had mental impact on her, but doesn't think it contributes now. She takes Vitamin D supplements and multivitamins. Patient denies any issues with pain. There are not significant appetite / nutritional concerns / weight changes - she stated she makes an effort to eat right and sometimes overeat. She cooks for herself. Patient does not report a history of head injury / trauma / cognitive impairment.      Current Outpatient Medications   Medication     etonogestrel-ethinyl estradiol (NUVARING) 0.12-0.015 MG/24HR vaginal ring     etonogestrel-ethinyl estradiol (NUVARING) 0.12-0.015 MG/24HR vaginal ring     Medication Adherence:  Patient reports taking prescribed medications as prescribed. She has not been prescribed psychotropic medications.    Patient Allergies:  No Known Allergies    Medical History:  No past medical history on file.      Current Mental Status Exam:   Appearance:  Appropriate    Eye Contact:  Good   Psychomotor:  Normal  Restless       Gait / station:  no problem  Attitude / Demeanor: Cooperative   Speech      Rate / Production: Normal/ Responsive Emotional      Volume:  Normal  volume      Language:  intact  Mood:   Anxious  Sad   Affect:   Appropriate    Thought Content: Clear   Thought Process: Coherent       Associations: No loosening of associations  Insight:   Good   Judgment:  Intact   Orientation:  All  Attention/concentration: Good    Rating Scales:    PHQ-9 SCORE 2/8/2021   PHQ-9 Total Score MyChart 10 (Moderate depression)   PHQ-9 Total Score 10      ALBERTO-7 SCORE 2/8/2021   Total Score 10 (moderate anxiety)   Total Score 10     Clinical Global Impressions  First:  Considering your total clinical experience with this particular patient population, how severe are the patient's symptoms at  this time?: 5 (2/8/2021  4:30 PM)  Most recent:  Compared to the patient's condition at the START of treatment, this patient's condition is: 4 (2/8/2021  4:30 PM)    Substance Use:  Patient did report a family history of substance use concerns - her uncles or great uncles used alcohol. Patient has not received chemical dependency treatment in the past.  Patient has not ever been to detox. Patient is not currently receiving any chemical dependency treatment. Patient reported the following problems as a result of their substance use: None.    Patient first used alcohol at age 18. Current Use: drinks once or twice per week, consuming one to two drinks of wine or whiskey.  Patient denies using tobacco.  Patient first used marijuana at age 21. She uses marijuana about every 6 months or less.   Patient drinks 16oz of coffee per day.   Patient reported no other substance use.     CAGE-AID Total Score 2/8/2021   Total Score 0     Based on the negative CAGE score and clinical interview there  are not indications of drug or alcohol abuse.    Significant Losses / Trauma / Abuse / Neglect Issues:   Patient did not serve in the . Concerns for possible neglect are not present. Patient did not indicate or report of significant loss, trauma, abuse or neglect issues.    Safety Assessment:   Current Safety Concerns: She denied suicidal ideation, plan, and intent. In the past couple of years, she had thoughts about suicide a few times - they are intrusive thoughts that pop in her head and then pop out. She denied past suicide attempts and self-injurious behavior.      [place] Suicide Severity Rating (Short Version) 2/8/2021   Over the past 2 weeks have you felt down, depressed, or hopeless? yes   Over the past 2 weeks have you had thoughts of killing yourself? no   Have you ever attempted to kill yourself? no     Patient denies current homicidal ideation and behaviors.  Patient denies current self-injurious ideation and  behaviors.    Patient denied risk behaviors associated with substance use.  Patient denies any high risk behaviors associated with mental health symptoms.  Patient reports the following current concerns for their personal safety: None.  Patient reports there are not firearms in the house.     History of Safety Concerns:  Patient denied a history of homicidal ideation.     Patient denied a history of personal safety concerns.    Patient denied a history of assaultive behaviors.    Patient denied a history of sexual assault behaviors.     Patient denied a history of risk behaviors associated with substance use.  Patient denies any history of high risk behaviors associated with mental health symptoms.  Patient reports the following protective factors: dedication to family/friends, safe and stable environment and committment to well-being    Risk Plan:  See Recommendations for Safety and Risk Management Plan    Review of Symptoms per patient report:  Depression: Difficulties concentrating, Change in appetite, Feelings of hopelessness, Feelings of helplessness, Low self-worth, Ruminations and Feeling sad, down, or depressed - hopeless, unsure about future.   Marya:  No Symptoms  Psychosis: No Symptoms  Anxiety: No Symptoms lately - it will come and go.   Panic:  No symptoms  Post Traumatic Stress Disorder:  No Symptoms   Eating Disorder: No Symptoms  ADD / ADHD:  No symptoms  Conduct Disorder: No symptoms  Autism Spectrum Disorder: No symptoms  Obsessive Compulsive Disorder: No Symptoms    Patient reports the following compulsive behaviors and treatment history: None.      Diagnostic Criteria:   A) Recurrent episode(s) - symptoms have been present during the same 2-week period and represent a change from previous functioning 5 or more symptoms (required for diagnosis)   - Depressed mood. Note: In children and adolescents, can be irritable mood.     - Diminished interest or pleasure in all, or almost all, activities.    -  Fatigue or loss of energy.    - Feelings of worthlessness or inappropriate and excessive guilt.    - Diminished ability to think or concentrate, or indecisiveness.   B) The symptoms cause clinically significant distress or impairment in social, occupational, or other important areas of functioning  C) The episode is not attributable to the physiological effects of a substance or to another medical condition  D) The occurence of major depressive episode is not better explained by other thought / psychotic disorders    Functional Status:  Patient reports the following functional impairments: relationship(s), self-care and work / vocational responsibilities.       WHODAS 2.0 Total Score 2/8/2021   Total Score 25   Total Score MyChart 25     Nonprogrammatic care:  Patient is requesting basic services to address current mental health concerns.    Clinical Summary:  1. Reason for assessment: patient wanted to discuss options  2. Psychosocial, Cultural and Contextual Factors: None identified  3. Principal DSM5 Diagnoses  (Sustained by DSM5 Criteria Listed Above):   311 (F32.9) Unspecified Depressive Disorder     4. Other Diagnoses that is relevant to services:     5. Provisional Diagnosis:     6. Prognosis: Relieve Acute Symptoms and Maintain Current Status / Prevent Deterioration  7. Likely consequences of symptoms if not treated: Patient may need higher level of care  8. Client strengths include:  educated, employed, insightful, intelligent and motivated    Recommendations:     1. Plan for Safety and Risk Management:   Recommended that patient call 911 or go to the local ED should there be a change in any of these risk factors. Report to child / adult protection services was NA.     2. Patient did not identify concerns with a cultural influence.     3. Initial Treatment will focus on: Depressed Mood.     4. Resources/Service Plan:    services are not indicated.   Modifications to assist communication are not  indicated.   Additional disability accommodations are not indicated.      5. Collaboration:  Collaboration / coordination of treatment will be initiated with the following support professionals:     6.  Referrals:    discussed meeting with her doctor to rule out underlying medical concerns, which patient has done. Patient is not interested in medications at this time, but may contact her doctor if she wants to pursue medications.  discussed MARC and DBSA support groups, and individual therapy.  recommended patient check with her Employee Assistance Program. Patient asked to be connected with a therapist, and  entered patient's information [facility].  noted several therapists/availability and discussed options. Patient and  looked up one provider. Patient decided she will contact her insurance to determine therapists in-network and will pursue individual therapy through her insurance.  sent [employer] message with MARC and DBSA information.     7. FERMIN: Recommendations:  NA.      8. Records were reviewed at time of assessment. Information in this assessment was obtained from the medical record and provided by patient who is a good historian. Patient will have open access to their mental health medical record.      Provider Name/ Credentials:  Deana Ramires, JANES, KEVIN, YARITZA  February 8, 2021

## 2021-08-10 ENCOUNTER — OFFICE VISIT (OUTPATIENT)
Dept: FAMILY MEDICINE | Facility: CLINIC | Age: 26
End: 2021-08-10
Payer: COMMERCIAL

## 2021-08-10 VITALS
DIASTOLIC BLOOD PRESSURE: 78 MMHG | HEART RATE: 61 BPM | SYSTOLIC BLOOD PRESSURE: 114 MMHG | TEMPERATURE: 97.8 F | OXYGEN SATURATION: 98 % | RESPIRATION RATE: 16 BRPM

## 2021-08-10 DIAGNOSIS — M25.561 ACUTE PAIN OF RIGHT KNEE: Primary | ICD-10-CM

## 2021-08-10 DIAGNOSIS — S83.411A SPRAIN OF MEDIAL COLLATERAL LIGAMENT OF RIGHT KNEE, INITIAL ENCOUNTER: ICD-10-CM

## 2021-08-10 DIAGNOSIS — M25.561 PATELLOFEMORAL ARTHRALGIA OF RIGHT KNEE: ICD-10-CM

## 2021-08-10 PROCEDURE — 99213 OFFICE O/P EST LOW 20 MIN: CPT | Performed by: PHYSICIAN ASSISTANT

## 2021-08-10 NOTE — PROGRESS NOTES
Chief Complaint   Patient presents with     Knee Pain       ASSESSMENT/PLAN:  Diagnoses and all orders for this visit:    Acute pain of right knee    Patellofemoral arthralgia of right knee    Sprain of medial collateral ligament of right knee, initial encounter    Overall the knee exam is reassuring.  Most consistent with patellofemoral syndrome of the knee.  Patient has a tournament coming up in 2 weeks and can use a knee sleeve for compression and support.  Ibuprofen and/or Voltaren gel before and after practice/training.    Sunny Pfeiffer PA-C  25 minutes spent on the date of the encounter doing chart review, history and exam, documentation and further activities per the note    SUBJECTIVE:  Laura is a 26 year old female who presents to urgent care with concerns about right knee pain.  Patient plays competitive ultimate Frisbee and also developed warts like soccer.  Over the last week or so she has noted some soreness in the right knee in the front and medial aspects of the last day or so she is noticed some instability when walking around her apartment.  No specific injury or trauma.  Has had a sprained MCL in one of her knees before.  No clicking or catching.  No muscle weakness or paresthesias.    ROS: Pertinent ROS neg other than the symptoms noted above in the HPI.     OBJECTIVE:  /78   Pulse 61   Temp 97.8  F (36.6  C)   Resp 16   SpO2 98%    GENERAL: healthy, alert and no distress  MS: Right lower extremity: No visible dislocation, deformity, swelling or discoloration of the right knee.  No significant bony tenderness.  Minimal intermittent tenderness over the MCL.  No knee effusion.  Lever test normal, MCL and LCL intact and strain does not cause pain.  Ruth's negative.  No significant joint line tenderness.  Mild pain underneath patella with knee extension against resistance   NEURO: Normal strength and tone, mentation intact and speech normal, normal gait    DIAGNOSTICS    No results  found for any visits on 08/10/21.     Current Outpatient Medications   Medication     etonogestrel-ethinyl estradiol (NUVARING) 0.12-0.015 MG/24HR vaginal ring     etonogestrel-ethinyl estradiol (NUVARING) 0.12-0.015 MG/24HR vaginal ring     No current facility-administered medications for this visit.      There is no problem list on file for this patient.     No past medical history on file.  Past Surgical History:   Procedure Laterality Date     C ORAL SURGERY PROCEDURE  age 16?    wisdom teeth     Family History   Problem Relation Age of Onset     Diabetes Paternal Grandmother      Social History     Tobacco Use     Smoking status: Never Smoker     Smokeless tobacco: Never Used   Substance Use Topics     Alcohol use: Yes     Comment: 4 drinks per week.               The plan of care was discussed with the patient. They understand and agree with the course of treatment prescribed. A printed summary was given including instructions and medications.  The use of Dragon/[employer] dictation services may have been used to construct the content in this note; any grammatical or spelling errors are non-intentional. Please contact the author of this note directly if you are in need of any clarification.

## 2021-08-10 NOTE — PATIENT INSTRUCTIONS
voltaren gel 3-4 times a day  Patient Education     Understanding Medial Collateral Ligament Sprain    The knee is a complex joint where the thighbone (femur) meets the shinbone (tibia). Strong tissues called ligaments connect these bones together. Ligaments also keep the bones aligned, so the knee only bends how it is supposed to. The medial collateral ligament (MCL) runs across the knee joint on the medial side of the leg. Injury to this ligament may be very painful. The knee may also not work the way it should.   Causes of an MCL sprain  An MCL sprain often happens when the knee joint is pushed beyond its normal range of motion. It's most common during a blow to the knee from the outside, pushing the knee inward. It may also happen if the knee is forced into a twist. These movements stretch and tear the MCL. Other parts of the knee may be damaged along with the MCL.   Knee sprains are graded by the amount of ligament damage.     Grade 1. There is mild damage to the ligament but the knee joint is still stable.    Grade 2. The ligament is stretched and loose. This is called a partial tear.    Grade 3. There is a complete tear to the ligament and the knee joint is unstable.  Symptoms of an MCL sprain  These include:    Knee pain    Knee swelling    Limited range of motion of the joint    Wobbly or unstable feeling in the joint  Treatment for an MCL sprain  Treatment will depend on the severity of the sprain and whether there is damage to other parts of the knee. Options often include:     Rest. This allows the knee to heal. Don't do any activities that stress the knee. Crutches, a knee brace, or both may also be recommended for a short time.    Cold packs and elevation of the knee. These help reduce swelling and relieve pain.    Compression. The knee may be wrapped with a bandage to help reduce swelling.    Medicines. These help relieve pain and swelling.    Exercises. These help improve the knee s stability,  strength, and range of motion.  If the injury is severe or several parts of the joint are involved, surgery may be an option. Surgery repairs the MCL and any other damaged structures.   When to call your healthcare provider  Call your healthcare provider right away if you have any of these:    Pain, swelling, or instability that doesn t get better with treatment or gets worse    New symptoms  Nimisha last reviewed this educational content on 6/1/2019 2000-2021 [employer]. All rights reserved. This information is not intended as a substitute for professional medical care. Always follow your healthcare professional's instructions.

## 2021-08-10 NOTE — PROGRESS NOTES
"No chief complaint on file.      ASSESSMENT/PLAN:  There are no diagnoses linked to this encounter.    Sunny Pfeiffer PA-C  {2021 E&M time:785326}    SUBJECTIVE:  Laura is a 26 year old female who presents to urgent care with ***.    ROS: Pertinent ROS neg other than the symptoms noted above in the HPI.     OBJECTIVE:  There were no vitals taken for this visit.   {Exam List (Optional):749965}    DIAGNOSTICS  {Diagnostic Test Results (Optional):592650}  No results found for any visits on 08/10/21.     Current Outpatient Medications   Medication     etonogestrel-ethinyl estradiol (NUVARING) 0.12-0.015 MG/24HR vaginal ring     etonogestrel-ethinyl estradiol (NUVARING) 0.12-0.015 MG/24HR vaginal ring     No current facility-administered medications for this visit.      There is no problem list on file for this patient.     No past medical history on file.  Past Surgical History:   Procedure Laterality Date     C ORAL SURGERY PROCEDURE  age 16?    wisdom teeth     Family History   Problem Relation Age of Onset     Diabetes Paternal Grandmother      Social History     Tobacco Use     Smoking status: Never Smoker     Smokeless tobacco: Never Used   Substance Use Topics     Alcohol use: Yes     Comment: 4 drinks per week.         {AMBULATORY ATTESTATION (Optional):786679}  {Diagnosis or treatment significantly limited by social determinants (optional):302604::\"Diagnosis or treatment significantly limited by social determinants of health - ***\"}    The plan of care was discussed with the patient. They understand and agree with the course of treatment prescribed. A printed summary was given including instructions and medications.  The use of Dragon/[employer] dictation services may have been used to construct the content in this note; any grammatical or spelling errors are non-intentional. Please contact the author of this note directly if you are in need of any clarification.     "

## 2021-09-28 ENCOUNTER — MYC REFILL (OUTPATIENT)
Dept: FAMILY MEDICINE | Facility: CLINIC | Age: 26
End: 2021-09-28

## 2021-09-28 DIAGNOSIS — Z30.44 ENCOUNTER FOR SURVEILLANCE OF VAGINAL RING HORMONAL CONTRACEPTIVE DEVICE: ICD-10-CM

## 2021-09-28 RX ORDER — ETONOGESTREL AND ETHINYL ESTRADIOL VAGINAL RING .015; .12 MG/D; MG/D
1 RING VAGINAL
Status: CANCELLED | OUTPATIENT
Start: 2021-09-28

## 2021-09-28 RX ORDER — ETONOGESTREL AND ETHINYL ESTRADIOL .12; .015 MG/D; MG/D
RING VAGINAL
Qty: 3 EACH | Refills: 0 | Status: SHIPPED | OUTPATIENT
Start: 2021-09-28 | End: 2021-12-01

## 2021-10-10 ENCOUNTER — HEALTH MAINTENANCE LETTER (OUTPATIENT)
Age: 26
End: 2021-10-10

## 2021-11-24 NOTE — PROGRESS NOTES
SUBJECTIVE:   CC: Laura Nolan is an 26 year old woman who presents for preventive health visit.       Healthy Habits:     Getting at least 3 servings of Calcium per day:  Yes    Bi-annual eye exam:  NO    Dental care twice a year:  Yes    Sleep apnea or symptoms of sleep apnea:  None    Diet:  Regular (no restrictions)    Frequency of exercise:  4-5 days/week    Duration of exercise:  45-60 minutes    Taking medications regularly:  Yes    Medication side effects:  None    PHQ-2 Total Score: 1    Additional concerns today:  Yes    [employer] team in the summer for exercise.   In gym in winter- lifting, cardio.    Today's PHQ-2 Score:   PHQ-2 ( 1999 Pfizer) 9/23/2020   Q1: Little interest or pleasure in doing things 1   Q2: Feeling down, depressed or hopeless 0   PHQ-2 Score 1   PHQ-2 Total Score (12-17 Years)- Positive if 3 or more points; Administer PHQ-A if positive 1   Q1: Little interest or pleasure in doing things Several days   Q2: Feeling down, depressed or hopeless Not at all   PHQ-2 Score 1     Feels mood change is more winter/darkness. Uses sun lamp and regular exercise.   Some circumstantial moods changes. Last year was hard with covid-19 but better this year.    Has done some counseling in college and through work which has only been modestly beneficial.  She is not interested in medication management for her mood as her symptoms are mild at this point    Abuse: Current or Past (Physical, Sexual or Emotional) - No  Do you feel safe in your environment? Yes    Have you ever done Advance Care Planning? (For example, a Health Directive, POLST, or a discussion with a medical provider or your loved ones about your wishes): No, advance care planning information given to patient to review.  Advanced care planning was discussed at today's visit.    Social History     Tobacco Use     Smoking status: Never Smoker     Smokeless tobacco: Never Used   Substance Use Topics     Alcohol use: Yes      Comment: 4 drinks per week.        Alcohol Use 9/24/2020   Prescreen: >3 drinks/day or >7 drinks/week? -   Prescreen: >3 drinks/day or >7 drinks/week? No       Reviewed orders with patient.  Reviewed health maintenance and updated orders accordingly - Yes      Breast Cancer Screening:  Any new diagnosis of family breast, ovarian, or bowel cancer? No    FHS-7: No flowsheet data found.  click delete button to remove this line now  Patient under 40 years of age: Routine Mammogram Screening not recommended.   Pertinent mammograms are reviewed under the imaging tab.    History of abnormal Pap smear: NO - age 21-29 PAP every 3 years recommended  PAP / HPV 9/24/2020   PAP (Historical) NIL     Reviewed and updated as needed this visit by clinical staff                Reviewed and updated as needed this visit by Provider                 Flu vaccine through [facility].     Not currently sexually active.   Uses ocp for some cycle irregularity and ? Of pcos.   Also using for acne.  Has helped but still some acne and wondering what more could be done. Using retinal moisturizer in evening and salicylic acid wash     Differin in the past was little harsh.   CereVe retinal    Wondering about physical therapy for various sports injuries.  She has no current active injuries but notes she tends to have symptoms that come up frequently and would like easy access to a physical therapist        Review of Systems   Constitutional: Negative for chills and fever.   HENT: Negative for congestion, ear pain, hearing loss and sore throat.    Eyes: Negative for pain.   Respiratory: Negative for cough and shortness of breath.    Cardiovascular: Negative for chest pain, palpitations and peripheral edema.   Gastrointestinal: Negative for abdominal pain, constipation, diarrhea, heartburn, hematochezia and nausea.   Genitourinary: Negative for dysuria, frequency, genital sores, hematuria and urgency.   Musculoskeletal: Negative for arthralgias, joint  "swelling and myalgias.   Skin: Negative for rash.   Neurological: Negative for dizziness, weakness, headaches and paresthesias.   Psychiatric/Behavioral: Negative for mood changes. The patient is not nervous/anxious.           OBJECTIVE:   /70 (BP Location: Right arm, Patient Position: Sitting, Cuff Size: Adult Regular)   Pulse 58   Temp 98.9  F (37.2  C) (Oral)   Resp 18   Ht 1.676 m (5' 6\")   Wt 69.4 kg (152 lb 14.4 oz)   LMP 11/24/2021   SpO2 100%   BMI 24.68 kg/m    Physical Exam  GENERAL APPEARANCE: healthy, alert and no distress  EYES: Eyes grossly normal to inspection, PERRL and conjunctivae and sclerae normal  HENT: ear canals and TM's normal, nose and mouth without ulcers or lesions, oropharynx clear and oral mucous membranes moist  NECK: no adenopathy, no asymmetry, masses, or scars and thyroid normal to palpation  RESP: lungs clear to auscultation - no rales, rhonchi or wheezes  BREAST: normal without masses, tenderness or nipple discharge and no palpable axillary masses or adenopathy  CV: regular rate and rhythm, normal S1 S2, no S3 or S4, no murmur, click or rub, no peripheral edema and peripheral pulses strong  ABDOMEN: soft, nontender, no hepatosplenomegaly, no masses and bowel sounds normal   (female): normal female external genitalia, normal urethral meatus, vaginal mucosal atrophy noted, normal cervix, adnexae, and uterus without masses or abnormal discharge  MS: no musculoskeletal defects are noted and gait is age appropriate without ataxia  SKIN: no suspicious lesions or rashes  NEURO: Normal strength and tone, sensory exam grossly normal, mentation intact and speech normal  PSYCH: mentation appears normal and affect normal/bright    Diagnostic Test Results:  Labs reviewed in Epic    ASSESSMENT/PLAN:   (Z00.00) Routine general medical examination at a health care facility  (primary encounter diagnosis)  Mild mood changes, currently stable and doing okay.  Reviewed follow-up in " "office if worsening symptoms.    (E28.2) PCOS (polycystic ovarian syndrome)  Comment: Reviewed note from endocrinologist last year.  Plan: etonogestrel-ethinyl estradiol (NUVARING)         0.12-0.015 MG/24HR vaginal ring, Glucose,         Hemoglobin A1c, Testosterone, total        Patient wishes to continue hormonal birth control to continue to help her symptoms.  Reviewed importance of monitoring glucose levels yearly.    (L68.0) Hirsutism  Comment: Occasional hairs noted on chin and breast  Plan: Currently not significantly problematic.    (L70.9) Acne, unspecified acne type  Comment: See instructions for acne OTC management.  Plan: etonogestrel-ethinyl estradiol (NUVARING)         0.12-0.015 MG/24HR vaginal ring, clindamycin         (CLEOCIN T) 1 % external lotion, Adult         Dermatology Referral        We will add clindamycin topical and adjust her OTC meds.  Consider also spironolactone in the future. Reviewed potential for drying skin, possible flare of sx's before things improve.  She will follow up with dermatology if ongoing concerns    (Z13.220) Screening cholesterol level  Comment:   Plan: Lipid panel reflex to direct LDL Fasting            (Z13.1) Screening for diabetes mellitus  Comment:   Plan: Glucose, Hemoglobin A1c            (Z11.59) Need for hepatitis C screening test  Comment:   Plan: Hepatitis C Screen Reflex to HCV RNA Quant and         Genotype                COUNSELING:  Reviewed preventive health counseling, as reflected in patient instructions       Regular exercise       Healthy diet/nutrition       Contraception       Osteoporosis prevention/bone health       Safe sex practices/STD prevention    Estimated body mass index is 23.49 kg/m  as calculated from the following:    Height as of 5/6/20: 1.702 m (5' 7\").    Weight as of 9/24/20: 68 kg (150 lb).        She reports that she has never smoked. She has never used smokeless tobacco.    Patient Instructions   Restart vitamin D supplement " this fall/winter.     For acne:  Benzoyl peroxide wash  Differin gel every other day  Clindamycin lotion 1-2 's daily.           Preventive Health Recommendations  Female Ages 21 to 25     Yearly exam:     See your health care provider every year in order to  o Review health changes.   o Discuss preventive care.    o Review your medicines if your doctor has prescribed any.      You should be tested each year for STDs (sexually transmitted diseases).       Talk to your provider about how often you should have cholesterol testing.      Get a Pap test every three years. If you have an abnormal result, your doctor may have you test more often.      If you are at risk for diabetes, you should have a diabetes test (fasting glucose).     Shots:     Get a flu shot each year.     Get a tetanus shot every 10 years.     Consider getting the shot (vaccine) that prevents cervical cancer (Gardasil).    Nutrition:     Eat at least 5 servings of fruits and vegetables each day.    Eat whole-grain bread, whole-wheat pasta and brown rice instead of white grains and rice.    Get adequate Calcium and Vitamin D.     Lifestyle    Exercise at least 150 minutes a week each week (30 minutes a day, 5 days a week). This will help you control your weight and prevent disease.    Limit alcohol to one drink per day.    No smoking.     Wear sunscreen to prevent skin cancer.    See your dentist every six months for an exam and cleaning.    Counseling Resources:  ATP IV Guidelines  Pooled Cohorts Equation Calculator  Breast Cancer Risk Calculator  BRCA-Related Cancer Risk Assessment: FHS-7 Tool  FRAX Risk Assessment  ICSI Preventive Guidelines  Dietary Guidelines for Americans, 2010  USDA's MyPlate  ASA Prophylaxis  Lung CA Screening    Autumn Reeder MD  [facility]  Answers for HPI/ROS submitted by the patient on 12/1/2021  If you checked off any problems, how difficult have these problems made it for you to do  your work, take care of things at home, or get along with other people?: Not difficult at all  PHQ9 TOTAL SCORE: 2  ALBERTO 7 TOTAL SCORE: 4

## 2021-12-01 ENCOUNTER — OFFICE VISIT (OUTPATIENT)
Dept: FAMILY MEDICINE | Facility: CLINIC | Age: 26
End: 2021-12-01
Payer: COMMERCIAL

## 2021-12-01 VITALS
BODY MASS INDEX: 24.57 KG/M2 | HEIGHT: 66 IN | DIASTOLIC BLOOD PRESSURE: 70 MMHG | OXYGEN SATURATION: 100 % | SYSTOLIC BLOOD PRESSURE: 116 MMHG | HEART RATE: 58 BPM | WEIGHT: 152.9 LBS | TEMPERATURE: 98.9 F | RESPIRATION RATE: 18 BRPM

## 2021-12-01 DIAGNOSIS — E28.2 PCOS (POLYCYSTIC OVARIAN SYNDROME): ICD-10-CM

## 2021-12-01 DIAGNOSIS — Z11.59 NEED FOR HEPATITIS C SCREENING TEST: ICD-10-CM

## 2021-12-01 DIAGNOSIS — Z00.00 ROUTINE GENERAL MEDICAL EXAMINATION AT A HEALTH CARE FACILITY: Primary | ICD-10-CM

## 2021-12-01 DIAGNOSIS — Z13.220 SCREENING CHOLESTEROL LEVEL: ICD-10-CM

## 2021-12-01 DIAGNOSIS — L70.9 ACNE, UNSPECIFIED ACNE TYPE: ICD-10-CM

## 2021-12-01 DIAGNOSIS — L68.0 HIRSUTISM: ICD-10-CM

## 2021-12-01 DIAGNOSIS — Z13.1 SCREENING FOR DIABETES MELLITUS: ICD-10-CM

## 2021-12-01 PROCEDURE — 99213 OFFICE O/P EST LOW 20 MIN: CPT | Mod: 25 | Performed by: FAMILY MEDICINE

## 2021-12-01 PROCEDURE — 99395 PREV VISIT EST AGE 18-39: CPT | Performed by: FAMILY MEDICINE

## 2021-12-01 RX ORDER — CLINDAMYCIN PHOSPHATE 10 UG/ML
LOTION TOPICAL 2 TIMES DAILY
Qty: 60 ML | Refills: 11 | Status: SHIPPED | OUTPATIENT
Start: 2021-12-01 | End: 2023-01-03

## 2021-12-01 RX ORDER — ETONOGESTREL AND ETHINYL ESTRADIOL VAGINAL RING .015; .12 MG/D; MG/D
1 RING VAGINAL
Qty: 3 EACH | Refills: 3 | Status: SHIPPED | OUTPATIENT
Start: 2021-12-01 | End: 2022-08-09

## 2021-12-01 ASSESSMENT — ANXIETY QUESTIONNAIRES
5. BEING SO RESTLESS THAT IT IS HARD TO SIT STILL: MORE THAN HALF THE DAYS
7. FEELING AFRAID AS IF SOMETHING AWFUL MIGHT HAPPEN: NOT AT ALL
7. FEELING AFRAID AS IF SOMETHING AWFUL MIGHT HAPPEN: NOT AT ALL
2. NOT BEING ABLE TO STOP OR CONTROL WORRYING: NOT AT ALL
4. TROUBLE RELAXING: SEVERAL DAYS
1. FEELING NERVOUS, ANXIOUS, OR ON EDGE: SEVERAL DAYS
GAD7 TOTAL SCORE: 4
8. IF YOU CHECKED OFF ANY PROBLEMS, HOW DIFFICULT HAVE THESE MADE IT FOR YOU TO DO YOUR WORK, TAKE CARE OF THINGS AT HOME, OR GET ALONG WITH OTHER PEOPLE?: SOMEWHAT DIFFICULT
3. WORRYING TOO MUCH ABOUT DIFFERENT THINGS: NOT AT ALL
GAD7 TOTAL SCORE: 4
GAD7 TOTAL SCORE: 4
6. BECOMING EASILY ANNOYED OR IRRITABLE: NOT AT ALL

## 2021-12-01 ASSESSMENT — ENCOUNTER SYMPTOMS
NAUSEA: 0
SORE THROAT: 0
COUGH: 0
DYSURIA: 0
DIARRHEA: 0
SHORTNESS OF BREATH: 0
FREQUENCY: 0
HEADACHES: 0
DIZZINESS: 0
CHILLS: 0
PALPITATIONS: 0
NERVOUS/ANXIOUS: 0
HEMATURIA: 0
ABDOMINAL PAIN: 0
CONSTIPATION: 0
FEVER: 0
WEAKNESS: 0
PARESTHESIAS: 0
MYALGIAS: 0
JOINT SWELLING: 0
HEMATOCHEZIA: 0
ARTHRALGIAS: 0
EYE PAIN: 0
HEARTBURN: 0

## 2021-12-01 ASSESSMENT — PAIN SCALES - GENERAL: PAINLEVEL: NO PAIN (0)

## 2021-12-01 ASSESSMENT — PATIENT HEALTH QUESTIONNAIRE - PHQ9
10. IF YOU CHECKED OFF ANY PROBLEMS, HOW DIFFICULT HAVE THESE PROBLEMS MADE IT FOR YOU TO DO YOUR WORK, TAKE CARE OF THINGS AT HOME, OR GET ALONG WITH OTHER PEOPLE: NOT DIFFICULT AT ALL
SUM OF ALL RESPONSES TO PHQ QUESTIONS 1-9: 2
SUM OF ALL RESPONSES TO PHQ QUESTIONS 1-9: 2

## 2021-12-01 ASSESSMENT — MIFFLIN-ST. JEOR: SCORE: 1455.3

## 2021-12-02 ASSESSMENT — PATIENT HEALTH QUESTIONNAIRE - PHQ9: SUM OF ALL RESPONSES TO PHQ QUESTIONS 1-9: 2

## 2021-12-02 ASSESSMENT — ANXIETY QUESTIONNAIRES: GAD7 TOTAL SCORE: 4

## 2021-12-03 PROBLEM — L70.9 ACNE, UNSPECIFIED ACNE TYPE: Status: ACTIVE | Noted: 2021-12-03

## 2021-12-03 PROBLEM — L68.0 HIRSUTISM: Status: ACTIVE | Noted: 2021-12-03

## 2021-12-03 PROBLEM — E28.2 PCOS (POLYCYSTIC OVARIAN SYNDROME): Status: ACTIVE | Noted: 2021-12-03

## 2022-08-09 ENCOUNTER — OFFICE VISIT (OUTPATIENT)
Dept: FAMILY MEDICINE | Facility: CLINIC | Age: 27
End: 2022-08-09
Payer: COMMERCIAL

## 2022-08-09 VITALS
OXYGEN SATURATION: 98 % | SYSTOLIC BLOOD PRESSURE: 112 MMHG | BODY MASS INDEX: 24.43 KG/M2 | HEIGHT: 66 IN | TEMPERATURE: 98.2 F | DIASTOLIC BLOOD PRESSURE: 75 MMHG | HEART RATE: 60 BPM | WEIGHT: 152 LBS

## 2022-08-09 DIAGNOSIS — Z00.00 ROUTINE GENERAL MEDICAL EXAMINATION AT A HEALTH CARE FACILITY: Primary | ICD-10-CM

## 2022-08-09 DIAGNOSIS — L70.0 ACNE VULGARIS: ICD-10-CM

## 2022-08-09 DIAGNOSIS — Z30.44 ENCOUNTER FOR SURVEILLANCE OF VAGINAL RING HORMONAL CONTRACEPTIVE DEVICE: ICD-10-CM

## 2022-08-09 DIAGNOSIS — Z11.3 SCREEN FOR STD (SEXUALLY TRANSMITTED DISEASE): ICD-10-CM

## 2022-08-09 DIAGNOSIS — E28.2 PCOS (POLYCYSTIC OVARIAN SYNDROME): ICD-10-CM

## 2022-08-09 PROCEDURE — 87389 HIV-1 AG W/HIV-1&-2 AB AG IA: CPT | Performed by: PHYSICIAN ASSISTANT

## 2022-08-09 PROCEDURE — 99395 PREV VISIT EST AGE 18-39: CPT | Performed by: PHYSICIAN ASSISTANT

## 2022-08-09 PROCEDURE — 36415 COLL VENOUS BLD VENIPUNCTURE: CPT | Performed by: PHYSICIAN ASSISTANT

## 2022-08-09 PROCEDURE — 87591 N.GONORRHOEAE DNA AMP PROB: CPT | Performed by: PHYSICIAN ASSISTANT

## 2022-08-09 PROCEDURE — 87491 CHLMYD TRACH DNA AMP PROBE: CPT | Performed by: PHYSICIAN ASSISTANT

## 2022-08-09 PROCEDURE — 86780 TREPONEMA PALLIDUM: CPT | Performed by: PHYSICIAN ASSISTANT

## 2022-08-09 PROCEDURE — 86803 HEPATITIS C AB TEST: CPT | Performed by: PHYSICIAN ASSISTANT

## 2022-08-09 RX ORDER — ETONOGESTREL AND ETHINYL ESTRADIOL VAGINAL RING .015; .12 MG/D; MG/D
1 RING VAGINAL
Qty: 3 EACH | Refills: 3 | Status: SHIPPED | OUTPATIENT
Start: 2022-08-09 | End: 2023-01-27

## 2022-08-09 ASSESSMENT — ENCOUNTER SYMPTOMS
DIARRHEA: 0
CHILLS: 0
SORE THROAT: 0
FREQUENCY: 0
MYALGIAS: 0
DYSURIA: 0
EYE PAIN: 0
SHORTNESS OF BREATH: 0
HEARTBURN: 0
HEADACHES: 0
CONSTIPATION: 0
BREAST MASS: 0
JOINT SWELLING: 0
ABDOMINAL PAIN: 0
NAUSEA: 0
COUGH: 0
DIZZINESS: 0
HEMATURIA: 0
PALPITATIONS: 0
HEMATOCHEZIA: 0
WEAKNESS: 0
PARESTHESIAS: 0
FEVER: 0
NERVOUS/ANXIOUS: 0
ARTHRALGIAS: 0

## 2022-08-09 NOTE — PROGRESS NOTES
SUBJECTIVE:   CC: Laura Nolan is an 27 year old woman who presents for preventive health visit.       Patient has been advised of split billing requirements and indicates understanding: Yes  Healthy Habits:     Getting at least 3 servings of Calcium per day:  Yes    Bi-annual eye exam:  NO    Dental care twice a year:  Yes    Sleep apnea or symptoms of sleep apnea:  None    Diet:  Regular (no restrictions)    Frequency of exercise:  6-7 days/week    Duration of exercise:  45-60 minutes    Taking medications regularly:  Yes    Medication side effects:  None    PHQ-2 Total Score: 2    Additional concerns today:  Yes      Sexual health - would like testing.     Was getting hives in the winter. Stopped in spring. Allergy testing - will hold off for now.     Acne. Derm referral. Hasn't tried clindamycin gel yet - plans to.     Works at [employer].           Today's PHQ-2 Score:   PHQ-2 ( 1999 Pfizer) 8/9/2022   Q1: Little interest or pleasure in doing things 1   Q2: Feeling down, depressed or hopeless 1   PHQ-2 Score 2   PHQ-2 Total Score (12-17 Years)- Positive if 3 or more points; Administer PHQ-A if positive -   Q1: Little interest or pleasure in doing things Several days   Q2: Feeling down, depressed or hopeless Several days   PHQ-2 Score 2       Abuse: Current or Past (Physical, Sexual or Emotional) - No  Do you feel safe in your environment? Yes        Social History     Tobacco Use     Smoking status: Never Smoker     Smokeless tobacco: Never Used   Substance Use Topics     Alcohol use: Yes     Comment: 2-4 drinks per week.         Alcohol Use 8/9/2022   Prescreen: >3 drinks/day or >7 drinks/week? No   Prescreen: >3 drinks/day or >7 drinks/week? -       Reviewed orders with patient.  Reviewed health maintenance and updated orders accordingly - Yes  BP Readings from Last 3 Encounters:   08/09/22 112/75   12/01/21 116/70   08/10/21 114/78    Wt Readings from Last 3 Encounters:   08/09/22 68.9 kg (152 lb)  "  12/01/21 69.4 kg (152 lb 14.4 oz)   09/24/20 68 kg (150 lb)                    Breast Cancer Screening:    Breast CA Risk Assessment (FHS-7) 12/1/2021   Do you have a family history of breast, colon, or ovarian cancer? No / Unknown         Patient under 40 years of age: Routine Mammogram Screening not recommended.   Pertinent mammograms are reviewed under the imaging tab.    History of abnormal Pap smear: NO - age 21-29 PAP every 3 years recommended  PAP / HPV 9/24/2020   PAP (Historical) NIL     Reviewed and updated as needed this visit by clinical staff   Tobacco  Allergies  Meds  Problems  Med Hx  Surg Hx  Fam Hx  Soc   Hx          Reviewed and updated as needed this visit by Provider   Tobacco  Allergies  Meds  Problems  Med Hx  Surg Hx  Fam Hx               Review of Systems   Constitutional: Negative for chills and fever.   HENT: Negative for congestion, ear pain, hearing loss and sore throat.    Eyes: Negative for pain and visual disturbance.   Respiratory: Negative for cough and shortness of breath.    Cardiovascular: Negative for chest pain, palpitations and peripheral edema.   Gastrointestinal: Negative for abdominal pain, constipation, diarrhea, heartburn, hematochezia and nausea.   Breasts:  Negative for tenderness, breast mass and discharge.   Genitourinary: Negative for dysuria, frequency, genital sores, hematuria, pelvic pain, urgency, vaginal bleeding and vaginal discharge.   Musculoskeletal: Negative for arthralgias, joint swelling and myalgias.   Skin: Negative for rash.   Neurological: Negative for dizziness, weakness, headaches and paresthesias.   Psychiatric/Behavioral: Negative for mood changes. The patient is not nervous/anxious.           OBJECTIVE:   /75 (BP Location: Right arm, Patient Position: Chair, Cuff Size: Adult Regular)   Pulse 60   Temp 98.2  F (36.8  C) (Oral)   Ht 1.676 m (5' 6\")   Wt 68.9 kg (152 lb)   SpO2 98%   BMI 24.53 kg/m    Physical " Exam  GENERAL: healthy, alert and no distress  EYES: Eyes grossly normal to inspection, PERRL and conjunctivae and sclerae normal  HENT: ear canals and TM's normal, nose and mouth without ulcers or lesions  NECK: no adenopathy, no asymmetry, masses, or scars and thyroid normal to palpation  RESP: lungs clear to auscultation - no rales, rhonchi or wheezes  CV: regular rate and rhythm, normal S1 S2, no S3 or S4, no murmur, click or rub, no peripheral edema and peripheral pulses strong  ABDOMEN: soft, nontender, no hepatosplenomegaly, no masses and bowel sounds normal   (female): normal female external genitalia, normal urethral meatus, vaginal mucosa pink, moist  MS: no gross musculoskeletal defects noted, no edema  SKIN: no suspicious lesions or rashes  NEURO: Normal strength and tone, mentation intact and speech normal  PSYCH: mentation appears normal, affect normal/bright        ASSESSMENT/PLAN:   1. Routine general medical examination at a health care facility  Well adult.     2. Encounter for surveillance of vaginal ring hormonal contraceptive device  3. PCOS (polycystic ovarian syndrome)  Refilled.  - etonogestrel-ethinyl estradiol (NUVARING) 0.12-0.015 MG/24HR vaginal ring; Place 1 each vaginally every 28 days  Dispense: 3 each; Refill: 3    4. Acne vulgaris  Would like referral renewed. Would like to see dermatology about acne concerns.   - Adult Dermatology Referral; Future    5. Screen for STD (sexually transmitted disease)  Screen.  - Hepatitis C Screen Reflex to HCV RNA Quant and Genotype; Future  - HIV Antigen Antibody Combo; Future  - Treponema Abs w Reflex to RPR and Titer; Future  - NEISSERIA GONORRHOEA PCR; Future  - CHLAMYDIA TRACHOMATIS PCR; Future    Patient has been advised of split billing requirements and indicates understanding: Yes    COUNSELING:  Special attention given to:        Regular exercise       Healthy diet/nutrition    Estimated body mass index is 24.53 kg/m  as calculated from  "the following:    Height as of this encounter: 1.676 m (5' 6\").    Weight as of this encounter: 68.9 kg (152 lb).        She reports that she has never smoked. She has never used smokeless tobacco.      Counseling Resources:  ATP IV Guidelines  Pooled Cohorts Equation Calculator  Breast Cancer Risk Calculator  BRCA-Related Cancer Risk Assessment: FHS-7 Tool  FRAX Risk Assessment  ICSI Preventive Guidelines  Dietary Guidelines for Americans, 2010  USDA's MyPlate  ASA Prophylaxis  Lung CA Screening    NANCY Pina [facility]  "

## 2022-08-10 LAB — T PALLIDUM AB SER QL: NONREACTIVE

## 2022-08-11 LAB
C TRACH DNA SPEC QL NAA+PROBE: NEGATIVE
HCV AB SERPL QL IA: NONREACTIVE
HIV 1+2 AB+HIV1 P24 AG SERPL QL IA: NONREACTIVE
N GONORRHOEA DNA SPEC QL NAA+PROBE: NEGATIVE

## 2022-09-24 ENCOUNTER — HEALTH MAINTENANCE LETTER (OUTPATIENT)
Age: 27
End: 2022-09-24

## 2022-11-04 ENCOUNTER — E-VISIT (OUTPATIENT)
Dept: FAMILY MEDICINE | Facility: CLINIC | Age: 27
End: 2022-11-04
Payer: COMMERCIAL

## 2022-11-04 DIAGNOSIS — N89.8 VAGINAL DISCHARGE: Primary | ICD-10-CM

## 2022-11-04 PROCEDURE — 99421 OL DIG E/M SVC 5-10 MIN: CPT | Performed by: FAMILY MEDICINE

## 2022-11-09 ENCOUNTER — LAB (OUTPATIENT)
Dept: LAB | Facility: CLINIC | Age: 27
End: 2022-11-09
Payer: COMMERCIAL

## 2022-11-09 DIAGNOSIS — N89.8 VAGINAL DISCHARGE: ICD-10-CM

## 2022-11-09 LAB
CLUE CELLS: ABNORMAL
TRICHOMONAS, WET PREP: ABNORMAL
WBC'S/HIGH POWER FIELD, WET PREP: ABNORMAL
YEAST, WET PREP: ABNORMAL

## 2022-11-09 PROCEDURE — 87210 SMEAR WET MOUNT SALINE/INK: CPT

## 2022-11-10 NOTE — RESULT ENCOUNTER NOTE
Here is the lab result    The 1+ white blood cell is very nonspecific    No definite infection    Advise a follow up exam with Lisa Christensen if your symptoms are persisting    Take care    Oliver Bernard MD

## 2023-01-03 DIAGNOSIS — L70.9 ACNE, UNSPECIFIED ACNE TYPE: ICD-10-CM

## 2023-01-03 DIAGNOSIS — Z30.44 ENCOUNTER FOR SURVEILLANCE OF VAGINAL RING HORMONAL CONTRACEPTIVE DEVICE: ICD-10-CM

## 2023-01-03 DIAGNOSIS — E28.2 PCOS (POLYCYSTIC OVARIAN SYNDROME): ICD-10-CM

## 2023-01-03 RX ORDER — CLINDAMYCIN PHOSPHATE 10 UG/ML
LOTION TOPICAL 2 TIMES DAILY
Qty: 120 ML | Refills: 0 | Status: SHIPPED | OUTPATIENT
Start: 2023-01-03

## 2023-01-03 NOTE — TELEPHONE ENCOUNTER
Clinic received message in Pt CRM request basket for pharmacy changed for medication. Writer unable to document in specific location message received. Writer made telephone encounter to document, see message below:    Routing to provider to review and advise.     PRASHANTH Bejarano  [facility]

## 2023-01-26 NOTE — TELEPHONE ENCOUNTER
Patient also requesting to have [facility].        Ariana Levine RN    [facility]

## 2023-01-27 ENCOUNTER — OFFICE VISIT (OUTPATIENT)
Dept: FAMILY MEDICINE | Facility: CLINIC | Age: 28
End: 2023-01-27
Payer: COMMERCIAL

## 2023-01-27 VITALS
WEIGHT: 157 LBS | TEMPERATURE: 98.3 F | OXYGEN SATURATION: 99 % | BODY MASS INDEX: 24.64 KG/M2 | SYSTOLIC BLOOD PRESSURE: 115 MMHG | DIASTOLIC BLOOD PRESSURE: 73 MMHG | HEIGHT: 67 IN | HEART RATE: 53 BPM

## 2023-01-27 DIAGNOSIS — E28.2 PCOS (POLYCYSTIC OVARIAN SYNDROME): ICD-10-CM

## 2023-01-27 DIAGNOSIS — Z12.4 SCREENING FOR CERVICAL CANCER: ICD-10-CM

## 2023-01-27 DIAGNOSIS — Z30.44 ENCOUNTER FOR SURVEILLANCE OF VAGINAL RING HORMONAL CONTRACEPTIVE DEVICE: ICD-10-CM

## 2023-01-27 DIAGNOSIS — N89.8 VAGINAL ITCHING: ICD-10-CM

## 2023-01-27 DIAGNOSIS — Z00.00 ROUTINE GENERAL MEDICAL EXAMINATION AT A HEALTH CARE FACILITY: Primary | ICD-10-CM

## 2023-01-27 DIAGNOSIS — L70.0 ACNE VULGARIS: ICD-10-CM

## 2023-01-27 DIAGNOSIS — B37.31 CANDIDIASIS OF VAGINA: ICD-10-CM

## 2023-01-27 PROCEDURE — G0145 SCR C/V CYTO,THINLAYER,RESCR: HCPCS | Performed by: PHYSICIAN ASSISTANT

## 2023-01-27 PROCEDURE — 99213 OFFICE O/P EST LOW 20 MIN: CPT | Mod: 25 | Performed by: PHYSICIAN ASSISTANT

## 2023-01-27 PROCEDURE — 87210 SMEAR WET MOUNT SALINE/INK: CPT | Performed by: PHYSICIAN ASSISTANT

## 2023-01-27 PROCEDURE — 99395 PREV VISIT EST AGE 18-39: CPT | Performed by: PHYSICIAN ASSISTANT

## 2023-01-27 RX ORDER — ETONOGESTREL AND ETHINYL ESTRADIOL VAGINAL RING .015; .12 MG/D; MG/D
1 RING VAGINAL
Qty: 3 EACH | Refills: 3 | OUTPATIENT
Start: 2023-01-27

## 2023-01-27 RX ORDER — FLUCONAZOLE 150 MG/1
150 TABLET ORAL ONCE
Qty: 1 TABLET | Refills: 0 | Status: SHIPPED | OUTPATIENT
Start: 2023-01-27 | End: 2023-01-27

## 2023-01-27 RX ORDER — ETONOGESTREL AND ETHINYL ESTRADIOL VAGINAL RING .015; .12 MG/D; MG/D
1 RING VAGINAL
Qty: 3 EACH | Refills: 3 | Status: SHIPPED | OUTPATIENT
Start: 2023-01-27 | End: 2024-01-05

## 2023-01-27 ASSESSMENT — ENCOUNTER SYMPTOMS
CONSTIPATION: 0
HEMATOCHEZIA: 0
FREQUENCY: 0
EYE PAIN: 0
DIARRHEA: 0
CHILLS: 0
PARESTHESIAS: 0
MYALGIAS: 0
DYSURIA: 0
JOINT SWELLING: 0
DIZZINESS: 0
BREAST MASS: 0
HEADACHES: 0
NERVOUS/ANXIOUS: 1
SORE THROAT: 0
HEMATURIA: 0
ABDOMINAL PAIN: 0
SHORTNESS OF BREATH: 0
ARTHRALGIAS: 0
NAUSEA: 0
HEARTBURN: 0
COUGH: 0
WEAKNESS: 0
FEVER: 0
PALPITATIONS: 0

## 2023-01-27 NOTE — PROGRESS NOTES
SUBJECTIVE:   CC: Laura is an 28 year old who presents for preventive health visit.     Patient has been advised of split billing requirements and indicates understanding: Yes  Healthy Habits:     Getting at least 3 servings of Calcium per day:  Yes    Bi-annual eye exam:  NO    Dental care twice a year:  Yes    Sleep apnea or symptoms of sleep apnea:  None    Diet:  Regular (no restrictions)    Frequency of exercise:  4-5 days/week    Duration of exercise:  Greater than 60 minutes    Taking medications regularly:  No    Barriers to taking medications:  None    Medication side effects:  None    PHQ-2 Total Score: 2    Additional concerns today:  Yes    Vaginal itching.   Normal discharge. Started in the fall. External soap. No vaginal cleanses. Will use something like vagisil - if really itchy.  No changes in periods.      No new partners in the last year. No pain with sexual activity. Female partner.    Nuvaring - past 3 years.     Getting occasional hives.     Still at [employer].           Today's PHQ-2 Score:   PHQ-2 ( 1999 Pfizer) 1/27/2023   Q1: Little interest or pleasure in doing things 1   Q2: Feeling down, depressed or hopeless 1   PHQ-2 Score 2   PHQ-2 Total Score (12-17 Years)- Positive if 3 or more points; Administer PHQ-A if positive -   Q1: Little interest or pleasure in doing things Several days   Q2: Feeling down, depressed or hopeless Several days   PHQ-2 Score 2           Social History     Tobacco Use     Smoking status: Never     Smokeless tobacco: Never   Substance Use Topics     Alcohol use: Yes     Comment: 2-4 drinks per week.         Alcohol Use 1/27/2023   Prescreen: >3 drinks/day or >7 drinks/week? No   Prescreen: >3 drinks/day or >7 drinks/week? -       Reviewed orders with patient.  Reviewed health maintenance and updated orders accordingly - Yes  BP Readings from Last 3 Encounters:   01/27/23 115/73   08/09/22 112/75   12/01/21 116/70    Wt Readings from Last 3 Encounters:  "  01/27/23 71.2 kg (157 lb)   08/09/22 68.9 kg (152 lb)   12/01/21 69.4 kg (152 lb 14.4 oz)                    Breast Cancer Screening:    Breast CA Risk Assessment (FHS-7) 12/1/2021   Do you have a family history of breast, colon, or ovarian cancer? No / Unknown         Patient under 40 years of age: Routine Mammogram Screening not recommended.   Pertinent mammograms are reviewed under the imaging tab.    History of abnormal Pap smear: NO - age 21-29 PAP every 3 years recommended  PAP / HPV 9/24/2020   PAP (Historical) NIL     Reviewed and updated as needed this visit by clinical staff   Tobacco  Allergies  Meds  Problems  Med Hx  Surg Hx  Fam Hx          Reviewed and updated as needed this visit by Provider   Tobacco  Allergies  Meds  Problems  Med Hx  Surg Hx  Fam Hx             Review of Systems   Constitutional: Negative for chills and fever.   HENT: Negative for congestion, ear pain, hearing loss and sore throat.    Eyes: Negative for pain and visual disturbance.   Respiratory: Negative for cough and shortness of breath.    Cardiovascular: Negative for chest pain, palpitations and peripheral edema.   Gastrointestinal: Negative for abdominal pain, constipation, diarrhea, heartburn, hematochezia and nausea.   Breasts:  Negative for tenderness, breast mass and discharge.   Genitourinary: Negative for dysuria, frequency, genital sores, hematuria, pelvic pain, urgency, vaginal bleeding and vaginal discharge.   Musculoskeletal: Negative for arthralgias, joint swelling and myalgias.   Skin: Negative for rash.   Neurological: Negative for dizziness, weakness, headaches and paresthesias.   Psychiatric/Behavioral: Negative for mood changes. The patient is nervous/anxious.           OBJECTIVE:   /73 (BP Location: Right arm, Patient Position: Sitting, Cuff Size: Adult Regular)   Pulse 53   Temp 98.3  F (36.8  C) (Oral)   Ht 1.708 m (5' 7.25\")   Wt 71.2 kg (157 lb)   LMP 01/02/2023 (Exact Date)   " SpO2 99%   BMI 24.41 kg/m    Physical Exam  GENERAL: healthy, alert and no distress  EYES: Eyes grossly normal to inspection, PERRL and conjunctivae and sclerae normal  HENT: ear canals and TM's normal, nose and mouth without ulcers or lesions  NECK: no adenopathy, no asymmetry, masses, or scars and thyroid normal to palpation  RESP: lungs clear to auscultation - no rales, rhonchi or wheezes  CV: regular rate and rhythm, normal S1 S2, no S3 or S4, no murmur, click or rub, no peripheral edema and peripheral pulses strong  ABDOMEN: soft, nontender, no hepatosplenomegaly, no masses and bowel sounds normal   (female): normal female external genitalia, normal urethral meatus, vaginal mucosa pink, moist, well rugated, and normal cervix/adnexa/uterus without masses. + white clumpy discharge  MS: no gross musculoskeletal defects noted, no edema  SKIN: no suspicious lesions or rashes  NEURO: Normal strength and tone, mentation intact and speech normal  PSYCH: mentation appears normal, affect normal/bright    Diagnostic Test Results:  Labs reviewed in Epic    ASSESSMENT/PLAN:   1. Routine general medical examination at a health care facility  Well adult.     2. Acne vulgaris  Referral placed.   - Adult Dermatology Referral; Future    3. PCOS (polycystic ovarian syndrome)  4. Encounter for surveillance of vaginal ring hormonal contraceptive device  Refilled.   - etonogestrel-ethinyl estradiol (NUVARING) 0.12-0.015 MG/24HR vaginal ring; Place 1 each vaginally every 28 days  Dispense: 3 each; Refill: 3    5. Candidiasis of vagina  6. Vaginal itching  Wet prep negative, but with itching and white clumpy discharge, will try yeast infection treatment. If not improving, consider ob/gyn referral.   - fluconazole (DIFLUCAN) 150 MG tablet; Take 1 tablet (150 mg) by mouth once for 1 dose  Dispense: 1 tablet; Refill: 0  - Wet prep - Clinic Collect    7. Screening for cervical cancer  Screen.  - Pap screen reflex to HPV if ASCUS -  recommend age 25 - 29     Patient has been advised of split billing requirements and indicates understanding: Yes      COUNSELING:  Special attention given to:        Regular exercise       Healthy diet/nutrition        She reports that she has never smoked. She has never used smokeless tobacco.          Lisa Christensen PA-C  [facility]

## 2023-01-31 LAB
BKR LAB AP GYN ADEQUACY: NORMAL
BKR LAB AP GYN INTERPRETATION: NORMAL
BKR LAB AP HPV REFLEX: NORMAL
BKR LAB AP LMP: NORMAL
BKR LAB AP PREVIOUS ABNORMAL: NORMAL
PATH REPORT.COMMENTS IMP SPEC: NORMAL
PATH REPORT.COMMENTS IMP SPEC: NORMAL
PATH REPORT.RELEVANT HX SPEC: NORMAL

## 2023-05-10 ENCOUNTER — OFFICE VISIT (OUTPATIENT)
Dept: FAMILY MEDICINE | Facility: CLINIC | Age: 28
End: 2023-05-10
Payer: COMMERCIAL

## 2023-05-10 DIAGNOSIS — L90.5 ACNE SCARRING: Primary | ICD-10-CM

## 2023-05-10 PROCEDURE — 99203 OFFICE O/P NEW LOW 30 MIN: CPT | Performed by: NURSE PRACTITIONER

## 2023-05-10 ASSESSMENT — PAIN SCALES - GENERAL: PAINLEVEL: NO PAIN (0)

## 2023-05-10 NOTE — LETTER
5/10/2023         RE: Laura Nolan  [address]  [address]  [facility] [address]        Dear Colleague,    Thank you for referring your patient, Laura Nolan, to the [facility] [place]. Please see a copy of my visit note below.    [facility] Dermatology Note  Encounter Date: May 10, 2023  Office Visit     Reviewed patients past medical history and pertinent chart review prior to patients visit today.     Dermatology Problem List:  Acne scarring.     ____________________________________________    Assessment & Plan:     # Acne scarring  - Recommended that she stop using the benzoyl peroxide wash and OTC retinol cream.  Recommended to avoid the sensitive areas with the adapalene gel.  Continue current skin care routine with addition of la roche posay vitamin c serum nightly.   -Continue adapalene 0.1% gel, avoiding the creases of nose, chin and around the eyes. Apply once every other day and increase to nightly as tolerate.  Waiting a few minutes after washing affected areas will decrease irritation. Method of application, side effects and expected results were discussed. The patient will apply pea size amount to the entire face, avoid areas around the eyes, corners of nose and mouth. Discussed side effects including photosensitivity and irritation. Do not apply to any areas she plans to do waxing or laser hair removal to for about least 4 weeks prior to procedures.   -moisturize with a cream such as Cetaphil cream, Cera Ve Cream or Vanicream nightly and more PRN for dryness.       Follow-up: 3 months for follow up if not well controlled. 1 year if well controlled on topical therapy only, sooner PRN new concerns.    Mervat Tian, CNP  Dermatology   _______________________________________    CC: Derm Problem    HPI:  Ms. Laura Nolan is a(n) 28 year old female who presents today as a new patient for acne scarring. Patient has acne scarring on her face.  She  reports that she did not have intense acne during adolescence but after she was came off of birth control she got a lot of acne to the jaw line and cheeks.  She then went back onto birthcontrol and started a skin routine which did help her skin.  She will only have occasional breakout and wants to focus on anti-aging treatments and scar treatment.  She is currently using Cetaphil gentle cleanser, CeraVe lotion, Neutrogena benzoyl peroxide 3.5% wash, and adapalene 0.1% gel but can only use the adapalene once weekly due to dryness around her nose/mouth area.  She does use a daily sunscreen which is marketed for acne prone skin.  She has tried an OTC retinol cream 1-2 times weekly and has noticed that this will cause breakouts and peeling to the sensitive areas of her face.  She reports that she does tan often and will try to apply sunscreen.  She is interested in receiving recommendations for hair removal.  She has previously done laser treatments for hair removal.    Patient is otherwise feeling well, without additional skin concerns.      Physical Exam:  Vitals: There were no vitals taken for this visit.   GENERAL APPEARANCE: Patient is awake, alert, and cooperative.  NAD.   SKIN: Acne exam, which includes the face, neck, upper central chest, and upper central back was performed.  - Postinflammatory erythema and scarring mostly to the cheeks and glabella.    - No other lesions of concern on areas examined.     Medications:  Current Outpatient Medications   Medication     etonogestrel-ethinyl estradiol (NUVARING) 0.12-0.015 MG/24HR vaginal ring     clindamycin (CLEOCIN T) 1 % external lotion     No current facility-administered medications for this visit.      Past Medical History:   Patient Active Problem List   Diagnosis     Hirsutism     PCOS (polycystic ovarian syndrome)     Acne, unspecified acne type     History reviewed. No pertinent past medical history.    CC Lisa Christensen PA-C  [address] [facility]  [place] [id number] on close of this encounter.         Again, thank you for allowing me to participate in the care of your patient.        Sincerely,        LOBITO Lobo CNP

## 2023-05-15 DIAGNOSIS — E28.2 PCOS (POLYCYSTIC OVARIAN SYNDROME): ICD-10-CM

## 2023-05-15 DIAGNOSIS — Z30.44 ENCOUNTER FOR SURVEILLANCE OF VAGINAL RING HORMONAL CONTRACEPTIVE DEVICE: ICD-10-CM

## 2023-05-15 RX ORDER — ETONOGESTREL AND ETHINYL ESTRADIOL VAGINAL RING .015; .12 MG/D; MG/D
1 RING VAGINAL
Qty: 3 EACH | Refills: 3 | Status: CANCELLED | OUTPATIENT
Start: 2023-05-15

## 2023-05-16 NOTE — TELEPHONE ENCOUNTER
Called pt and left vm. Please let pt know they have a year rx of etonogestrel-ethinyl estradiol (NUVARING) 0.12-0.015 MG/24HR vaginal ring start date of 1/27/23.    Bibi WALTERS MA

## 2023-05-22 NOTE — TELEPHONE ENCOUNTER
2nd attempt. Called patient and left voicemail regarding Nuvaring.     Alicja Gamboa -    [facility]

## 2024-01-04 ENCOUNTER — PATIENT OUTREACH (OUTPATIENT)
Dept: CARE COORDINATION | Facility: CLINIC | Age: 29
End: 2024-01-04
Payer: COMMERCIAL

## 2024-01-04 DIAGNOSIS — E28.2 PCOS (POLYCYSTIC OVARIAN SYNDROME): ICD-10-CM

## 2024-01-04 DIAGNOSIS — Z30.44 ENCOUNTER FOR SURVEILLANCE OF VAGINAL RING HORMONAL CONTRACEPTIVE DEVICE: ICD-10-CM

## 2024-01-05 RX ORDER — ETONOGESTREL AND ETHINYL ESTRADIOL VAGINAL RING .015; .12 MG/D; MG/D
RING VAGINAL
Qty: 3 EACH | Refills: 3 | Status: SHIPPED | OUTPATIENT
Start: 2024-01-05 | End: 2024-03-26

## 2024-01-10 ENCOUNTER — THERAPY VISIT (OUTPATIENT)
Dept: PHYSICAL THERAPY | Facility: CLINIC | Age: 29
End: 2024-01-10
Payer: COMMERCIAL

## 2024-01-10 DIAGNOSIS — M25.569 PAIN IN PATELLA, UNSPECIFIED LATERALITY: Primary | ICD-10-CM

## 2024-01-10 PROCEDURE — 97161 PT EVAL LOW COMPLEX 20 MIN: CPT | Mod: GP | Performed by: PHYSICAL THERAPIST

## 2024-01-10 PROCEDURE — 97110 THERAPEUTIC EXERCISES: CPT | Mod: 59 | Performed by: PHYSICAL THERAPIST

## 2024-01-10 PROCEDURE — 97530 THERAPEUTIC ACTIVITIES: CPT | Mod: GP | Performed by: PHYSICAL THERAPIST

## 2024-01-10 ASSESSMENT — ACTIVITIES OF DAILY LIVING (ADL)
GO UP STAIRS: ACTIVITY IS NOT DIFFICULT
LIMPING: THE SYMPTOM AFFECTS MY ACTIVITY SLIGHTLY
SWELLING: I HAVE THE SYMPTOM BUT IT DOES NOT AFFECT MY ACTIVITY
KNEE_ACTIVITY_OF_DAILY_LIVING_SCORE: 91.43
RISE FROM A CHAIR: ACTIVITY IS NOT DIFFICULT
SWELLING: I HAVE THE SYMPTOM BUT IT DOES NOT AFFECT MY ACTIVITY
KNEEL ON THE FRONT OF YOUR KNEE: ACTIVITY IS NOT DIFFICULT
STAND: ACTIVITY IS NOT DIFFICULT
GIVING WAY, BUCKLING OR SHIFTING OF KNEE: I DO NOT HAVE THE SYMPTOM
SIT WITH YOUR KNEE BENT: ACTIVITY IS NOT DIFFICULT
PLEASE_INDICATE_YOR_PRIMARY_REASON_FOR_REFERRAL_TO_THERAPY:: KNEE
WALK: ACTIVITY IS NOT DIFFICULT
HOW_WOULD_YOU_RATE_THE_CURRENT_FUNCTION_OF_YOUR_KNEE_DURING_YOUR_USUAL_DAILY_ACTIVITIES_ON_A_SCALE_FROM_0_TO_100_WITH_100_BEING_YOUR_LEVEL_OF_KNEE_FUNCTION_PRIOR_TO_YOUR_INJURY_AND_0_BEING_THE_INABILITY_TO_PERFORM_ANY_OF_YOUR_USUAL_DAILY_ACTIVITIES?: 95
AS_A_RESULT_OF_YOUR_KNEE_INJURY,_HOW_WOULD_YOU_RATE_YOUR_CURRENT_LEVEL_OF_DAILY_ACTIVITY?: NORMAL
SQUAT: ACTIVITY IS NOT DIFFICULT
AS_A_RESULT_OF_YOUR_KNEE_INJURY,_HOW_WOULD_YOU_RATE_YOUR_CURRENT_LEVEL_OF_DAILY_ACTIVITY?: NORMAL
WALK: ACTIVITY IS NOT DIFFICULT
SQUAT: ACTIVITY IS NOT DIFFICULT
PAIN: THE SYMPTOM AFFECTS MY ACTIVITY SLIGHTLY
HOW_WOULD_YOU_RATE_THE_CURRENT_FUNCTION_OF_YOUR_KNEE_DURING_YOUR_USUAL_DAILY_ACTIVITIES_ON_A_SCALE_FROM_0_TO_100_WITH_100_BEING_YOUR_LEVEL_OF_KNEE_FUNCTION_PRIOR_TO_YOUR_INJURY_AND_0_BEING_THE_INABILITY_TO_PERFORM_ANY_OF_YOUR_USUAL_DAILY_ACTIVITIES?: 95
STAND: ACTIVITY IS NOT DIFFICULT
SIT WITH YOUR KNEE BENT: ACTIVITY IS NOT DIFFICULT
RAW_SCORE: 64
HOW_WOULD_YOU_RATE_THE_OVERALL_FUNCTION_OF_YOUR_KNEE_DURING_YOUR_USUAL_DAILY_ACTIVITIES?: NORMAL
RISE FROM A CHAIR: ACTIVITY IS NOT DIFFICULT
WEAKNESS: I DO NOT HAVE THE SYMPTOM
KNEEL ON THE FRONT OF YOUR KNEE: ACTIVITY IS NOT DIFFICULT
PAIN: THE SYMPTOM AFFECTS MY ACTIVITY SLIGHTLY
GO UP STAIRS: ACTIVITY IS NOT DIFFICULT
GO DOWN STAIRS: ACTIVITY IS MINIMALLY DIFFICULT
HOW_WOULD_YOU_RATE_THE_OVERALL_FUNCTION_OF_YOUR_KNEE_DURING_YOUR_USUAL_DAILY_ACTIVITIES?: NORMAL
GIVING WAY, BUCKLING OR SHIFTING OF KNEE: I DO NOT HAVE THE SYMPTOM
WEAKNESS: I DO NOT HAVE THE SYMPTOM
GO DOWN STAIRS: ACTIVITY IS MINIMALLY DIFFICULT
STIFFNESS: I DO NOT HAVE THE SYMPTOM
STIFFNESS: I DO NOT HAVE THE SYMPTOM
KNEE_ACTIVITY_OF_DAILY_LIVING_SUM: 64
LIMPING: THE SYMPTOM AFFECTS MY ACTIVITY SLIGHTLY

## 2024-01-10 NOTE — PROGRESS NOTES
PHYSICAL THERAPY EVALUATION  Type of Visit: Evaluation    See electronic medical record for Abuse and Falls Screening details.    Subjective       Presenting condition or subjective complaint: Referral for knee pain following an overuse injury.  Date of onset: 11/01/23    Relevant medical history:     Dates & types of surgery: [place]- June 2012    Prior diagnostic imaging/testing results: X-ray     Prior therapy history for the same diagnosis, illness or injury: No      Prior Level of Function  Transfers:   Ambulation:   ADL:   IADL:     Living Environment  Social support: Alone   Type of home: Apartment/condo   Stairs to enter the home: Yes 50 Is there a railing: Yes   Ramp: No   Stairs inside the home: No       Help at home: None  Equipment owned:       Employment: Yes Research and   Hobbies/Interests: Running, ultimate frisbee, Pickleball, painting, scrabble    Patient goals for therapy: Run and exercise without pain. Work to develop plan to prevent this in the future.    Pain assessment: See objective evaluation for additional pain details   Has pain with running now. Gets worse. Runs 3 times a week outdoors.  Runs 3-5 miles.  Pain began post hike in November doing [place] [facility] [place]. Did not have poles.  Pain on the way down was worse.      X-rays were negative.    Lifts two times a week. Has been doing squats and lunges.   Will be playing ultimate 2 times a week.  Had some pain with pickle ball.  Used to play soccer.  Had an MCL sprain at one point.    Objective   KNEE EVALUATION  PAIN: Pain is Exacerbated By: running  INTEGUMENTARY (edema, incisions): Sweep Test: positive on the right  POSTURE:   GAIT:  Weightbearing Status: WBAT  Assistive Device(s):   Gait Deviations:   BALANCE/PROPRIOCEPTION: Single Leg Stance Eyes Open (seconds): 30  WEIGHTBEARING ALIGNMENT:   NON-WEIGHTBEARING ALIGNMENT:   ROM: PROM WFL    STRENGTH:   Pain: - none + mild ++ moderate +++  severe  Strength Scale: 0-5/5 Left Right   Knee Flexion 5 5   Knee Extension 5 5-   Quad Set       FLEXIBILITY: Decreased hip flexors L, Decreased soleus L, Decreased hip flexors R, Decreased quadriceps R  SPECIAL TESTS: WNL  FUNCTIONAL TESTS:  pain right knee with backward lunge  PALPATION: WFL  JOINT MOBILITY:     Assessment & Plan   CLINICAL IMPRESSIONS  Medical Diagnosis: patellar pain    Treatment Diagnosis: patellar pain   Impression/Assessment: Patient is a 28 year old female with patellar complaints.  The following significant findings have been identified: Pain, Decreased ROM/flexibility, Decreased strength, and Decreased activity tolerance. These impairments interfere with their ability to perform recreational activities and community mobility as compared to previous level of function.     Clinical Decision Making (Complexity):  Clinical Presentation: Stable/Uncomplicated  Clinical Presentation Rationale: based on medical and personal factors listed in PT evaluation  Clinical Decision Making (Complexity): Low complexity    PLAN OF CARE  Treatment Interventions:  Interventions: Manual Therapy, Neuromuscular Re-education, Therapeutic Activity, Therapeutic Exercise    Long Term Goals     PT Goal 1  Goal Identifier: running  Goal Description: able to run 3 miles without knee pain  Rationale: to maximize safety and independence with performance of ADLs and functional tasks  Target Date: 02/09/24      Frequency of Treatment: once a week  Duration of Treatment: 3 weeks    Recommended Referrals to Other Professionals:   Education Assessment:   Learner/Method: Patient;Listening;Reading;Demonstration;Pictures/Video;No Barriers to Learning    Risks and benefits of evaluation/treatment have been explained.   Patient/Family/caregiver agrees with Plan of Care.     Evaluation Time:     PT Eval, Low Complexity Minutes (89636): 20       Signing Clinician: Ene Owens, PT

## 2024-01-17 ENCOUNTER — THERAPY VISIT (OUTPATIENT)
Dept: PHYSICAL THERAPY | Facility: CLINIC | Age: 29
End: 2024-01-17
Payer: COMMERCIAL

## 2024-01-17 DIAGNOSIS — M25.569 PAIN IN PATELLA, UNSPECIFIED LATERALITY: Primary | ICD-10-CM

## 2024-01-17 PROCEDURE — 97110 THERAPEUTIC EXERCISES: CPT | Mod: 59 | Performed by: PHYSICAL THERAPIST

## 2024-01-17 PROCEDURE — 97530 THERAPEUTIC ACTIVITIES: CPT | Mod: GP | Performed by: PHYSICAL THERAPIST

## 2024-01-18 ENCOUNTER — PATIENT OUTREACH (OUTPATIENT)
Dept: CARE COORDINATION | Facility: CLINIC | Age: 29
End: 2024-01-18
Payer: COMMERCIAL

## 2024-01-19 ENCOUNTER — TRANSCRIBE ORDERS (OUTPATIENT)
Dept: ORTHOPEDICS | Facility: CLINIC | Age: 29
End: 2024-01-19

## 2024-01-19 DIAGNOSIS — M22.2X1 PATELLOFEMORAL PAIN SYNDROME OF BOTH KNEES: Primary | ICD-10-CM

## 2024-01-19 DIAGNOSIS — M22.2X2 PATELLOFEMORAL PAIN SYNDROME OF BOTH KNEES: Primary | ICD-10-CM

## 2024-01-24 ENCOUNTER — THERAPY VISIT (OUTPATIENT)
Dept: PHYSICAL THERAPY | Facility: CLINIC | Age: 29
End: 2024-01-24
Payer: COMMERCIAL

## 2024-01-24 DIAGNOSIS — M25.569 PAIN IN PATELLA, UNSPECIFIED LATERALITY: Primary | ICD-10-CM

## 2024-01-24 PROCEDURE — 97110 THERAPEUTIC EXERCISES: CPT | Mod: GP | Performed by: PHYSICAL THERAPIST

## 2024-01-24 NOTE — PROGRESS NOTES
Please refer to the progress note completed on 1/24/2024 for discharge information.      PLAN  Return if no progress on her own    Beginning/End Dates of Progress Note Reporting Period:1/10/2024-1/24/2023 01/24/24 0500   Appointment Info   Signing clinician's name / credentials Ene CanasATC   Total/Authorized Visits E+T(3)   Visits Used 3   Medical Diagnosis patellar pain   PT Tx Diagnosis patellar pain   Other pertinent information runs, lifts weights, ultimate, former ,  at    Progress Note/Certification   Onset of illness/injury or Date of Surgery 11/01/23   Therapy Frequency once a week   Predicted Duration 3 weeks   Progress Note Completed Date 01/24/24   PT Goal 1   Goal Identifier running   Goal Description able to run 3 miles without knee pain   Rationale to maximize safety and independence with performance of ADLs and functional tasks   Goal Progress ran 2.5 miles and had some dull pain after a mile but did not stop running in the patellar region   Target Date 02/09/24   Subjective Report   Subjective Report Did not have a lot of time over the last week to work on exercises.  Played ultimate and [employer] ball.  Ran once for 2.5 miles and thought about landing quietly.   Objective Measures   Objective Measures Objective Measure 1;Objective Measure 2;Objective Measure 3   Objective Measure 1   Objective Measure Solitario test   Details improving with hip flexor stretching   Objective Measure 2   Objective Measure swelling   Details continues to have medial and superior patellar swelling   Objective Measure 3   Objective Measure running eval   Details Has [employer].  Increased veridical noted.  No increase in pronation.  Lateral heel strike with anterior pelvic tilt and decreased hip extension.  Slight IR of the femur noted with walking more than running gait.   Treatment Interventions (PT)   Interventions Therapeutic Procedure/Exercise;Therapeutic Activity   Therapeutic  Procedure/Exercise   Therapeutic Procedures: strength, endurance, ROM, flexibility minutes (28851) 23   PTRx Ther Proc 1 Kneeling Hip Flexor Stretch   PTRx Ther Proc 1 - Details 2x 30 sec   PTRx Ther Proc 2 Standing Quadriceps Stretch using Chair   PTRx Ther Proc 2 - Details HEP   PTRx Ther Proc 3 Standing Gastroc Stretch   PTRx Ther Proc 3 - Details 30 sec   PTRx Ther Proc 4 Self Talocrural Joint Mobility with Band   PTRx Ther Proc 4 - Details 5 reps with silver tubing   PTRx Ther Proc 5 Patella Mobilization Medial   PTRx Ther Proc 5 - Details massage inside on patella and also on top to help with swelling try after running   PTRx Ther Proc 6 Functional Lunge Backward   PTRx Ther Proc 6 - Details continue with these for strengthening   PTRx Ther Proc 7 Lateral Step Down   PTRx Ther Proc 7 - Details 6 inch height no pain today   PTRx Ther Proc 8 Clamshell with Theraband   PTRx Ther Proc 8 - Details reviewed form   PTRx Ther Proc 9 Squats with Theraband   PTRx Ther Proc 9 - Details holding for 10 sec reviewed form   PTRx Ther Proc 10 Standing Fire Hydrant without Support   PTRx Ther Proc 10 - Details reviewed form   Skilled Intervention instruction in ex to help with knee pain   Patient Response/Progress understanding expressed.   PTRx Ther Proc 11 Side Stepping With Theraband   PTRx Ther Proc 11 - Details 10 reps   PTRx Ther Proc 12 Drop Jumps   PTRx Ther Proc 12 - Details cues for landing quietly from 8 inch height   PTRx Ther Proc 13 Lateral Lunge with Dumbbells   PTRx Ther Proc 13 - Details 5 reps   PTRx Ther Proc 14 Gluteal Myofascial Piriformis Cruncher   PTRx Ther Proc 14 - Details work to fatigue should feel this in buttock can do reps and sets like the other exercises   Therapeutic Activity   Therapeutic Activities: dynamic activities to improve functional performance minutes (85311) 5   PTRx Ther Act 1 Education Sheet General   PTRx Ther Act 1 - Details run quietly high knee drills knee pad for landing on  the keritherapy knee with ice after ultimate if irritated   Education   Learner/Method Patient;Listening;Reading;Demonstration;Pictures/Video;No Barriers to Learning   Plan   Home program see ptrx   Updates to plan of care focus on strengthening and control for swelling   Plan for next session patient to return if needed   Total Session Time   Timed Code Treatment Minutes 28   Total Treatment Time (sum of timed and untimed services) 28    01/24/2024    Referring Provider:  Ro Pulido

## 2024-03-26 ENCOUNTER — OFFICE VISIT (OUTPATIENT)
Dept: FAMILY MEDICINE | Facility: CLINIC | Age: 29
End: 2024-03-26
Payer: COMMERCIAL

## 2024-03-26 VITALS
BODY MASS INDEX: 23.98 KG/M2 | TEMPERATURE: 97.8 F | SYSTOLIC BLOOD PRESSURE: 119 MMHG | WEIGHT: 152.8 LBS | HEART RATE: 58 BPM | HEIGHT: 67 IN | OXYGEN SATURATION: 100 % | DIASTOLIC BLOOD PRESSURE: 72 MMHG | RESPIRATION RATE: 12 BRPM

## 2024-03-26 DIAGNOSIS — H91.93 DECREASED HEARING OF BOTH EARS: ICD-10-CM

## 2024-03-26 DIAGNOSIS — Z30.44 ENCOUNTER FOR SURVEILLANCE OF VAGINAL RING HORMONAL CONTRACEPTIVE DEVICE: ICD-10-CM

## 2024-03-26 DIAGNOSIS — Z00.00 ROUTINE GENERAL MEDICAL EXAMINATION AT A HEALTH CARE FACILITY: Primary | ICD-10-CM

## 2024-03-26 DIAGNOSIS — Z11.3 SCREEN FOR STD (SEXUALLY TRANSMITTED DISEASE): ICD-10-CM

## 2024-03-26 DIAGNOSIS — L98.9 SKIN LESION: ICD-10-CM

## 2024-03-26 DIAGNOSIS — Z13.220 SCREENING FOR HYPERLIPIDEMIA: ICD-10-CM

## 2024-03-26 DIAGNOSIS — E28.2 PCOS (POLYCYSTIC OVARIAN SYNDROME): ICD-10-CM

## 2024-03-26 PROCEDURE — 99395 PREV VISIT EST AGE 18-39: CPT | Performed by: PHYSICIAN ASSISTANT

## 2024-03-26 PROCEDURE — 87389 HIV-1 AG W/HIV-1&-2 AB AG IA: CPT | Performed by: PHYSICIAN ASSISTANT

## 2024-03-26 PROCEDURE — 87491 CHLMYD TRACH DNA AMP PROBE: CPT | Performed by: PHYSICIAN ASSISTANT

## 2024-03-26 PROCEDURE — 36415 COLL VENOUS BLD VENIPUNCTURE: CPT | Performed by: PHYSICIAN ASSISTANT

## 2024-03-26 PROCEDURE — 87591 N.GONORRHOEAE DNA AMP PROB: CPT | Performed by: PHYSICIAN ASSISTANT

## 2024-03-26 PROCEDURE — 86803 HEPATITIS C AB TEST: CPT | Performed by: PHYSICIAN ASSISTANT

## 2024-03-26 PROCEDURE — 80061 LIPID PANEL: CPT | Performed by: PHYSICIAN ASSISTANT

## 2024-03-26 RX ORDER — ETONOGESTREL AND ETHINYL ESTRADIOL VAGINAL RING .015; .12 MG/D; MG/D
1 RING VAGINAL
Qty: 3 EACH | Refills: 3 | Status: SHIPPED | OUTPATIENT
Start: 2024-03-26

## 2024-03-26 SDOH — HEALTH STABILITY: PHYSICAL HEALTH: ON AVERAGE, HOW MANY MINUTES DO YOU ENGAGE IN EXERCISE AT THIS LEVEL?: 40 MIN

## 2024-03-26 SDOH — HEALTH STABILITY: PHYSICAL HEALTH: ON AVERAGE, HOW MANY DAYS PER WEEK DO YOU ENGAGE IN MODERATE TO STRENUOUS EXERCISE (LIKE A BRISK WALK)?: 6 DAYS

## 2024-03-26 ASSESSMENT — SOCIAL DETERMINANTS OF HEALTH (SDOH): HOW OFTEN DO YOU GET TOGETHER WITH FRIENDS OR RELATIVES?: MORE THAN THREE TIMES A WEEK

## 2024-03-26 NOTE — PATIENT INSTRUCTIONS
Preventive Care Advice   This is general advice given by our system to help you stay healthy. However, your care team may have specific advice just for you. Please talk to your care team about your preventive care needs.  Nutrition  Eat 5 or more servings of fruits and vegetables each day.  Try wheat bread, brown rice and whole grain pasta (instead of white bread, rice, and pasta).  Get enough calcium and vitamin D. Check the label on foods and aim for 100% of the RDA (recommended daily allowance).  Lifestyle  Exercise at least 150 minutes each week   (30 minutes a day, 5 days a week).  Do muscle strengthening activities 2 days a week. These help control your weight and prevent disease.  No smoking.  Wear sunscreen to prevent skin cancer.  Have a dental exam and cleaning every 6 months.  Yearly exams  See your health care team every year to talk about:  Any changes in your health.  Any medicines your care team has prescribed.  Preventive care, family planning, and ways to prevent chronic diseases.  Shots (vaccines)   HPV shots (up to age 26), if you've never had them before.  Hepatitis B shots (up to age 59), if you've never had them before.  COVID-19 shot: Get this shot when it's due.  Flu shot: Get a flu shot every year.  Tetanus shot: Get a tetanus shot every 10 years.  Pneumococcal, hepatitis A, and RSV shots: Ask your care team if you need these based on your risk.  Shingles shot (for age 50 and up).  General health tests  Diabetes screening:  Starting at age 35, Get screened for diabetes at least every 3 years.  If you are younger than age 35, ask your care team if you should be screened for diabetes.  Cholesterol test: At age 39, start having a cholesterol test every 5 years, or more often if advised.  Bone density scan (DEXA): At age 50, ask your care team if you should have this scan for osteoporosis (brittle bones).  Hepatitis C: Get tested at least once in your life.  STIs (sexually transmitted  infections)  Before age 24: Ask your care team if you should be screened for STIs.  After age 24: Get screened for STIs if you're at risk. You are at risk for STIs (including HIV) if:  You are sexually active with more than one person.  You don't use condoms every time.  You or a partner was diagnosed with a sexually transmitted infection.  If you are at risk for HIV, ask about PrEP medicine to prevent HIV.  Get tested for HIV at least once in your life, whether you are at risk for HIV or not.  Cancer screening tests  Cervical cancer screening: If you have a cervix, begin getting regular cervical cancer screening tests at age 21. Most people who have regular screenings with normal results can stop after age 65. Talk about this with your provider.  Breast cancer scan (mammogram): If you've ever had breasts, begin having regular mammograms starting at age 40. This is a scan to check for breast cancer.  Colon cancer screening: It is important to start screening for colon cancer at age 45.  Have a colonoscopy test every 10 years (or more often if you're at risk) Or, ask your provider about stool tests like a FIT test every year or Cologuard test every 3 years.  To learn more about your testing options, visit: [email or web address]://[email or web address].  For help making a decision, visit: https://bit.ly/xi38371.  Prostate cancer screening test: If you have a prostate and are age 55 to 69, ask your provider if you would benefit from a yearly prostate cancer screening test.  Lung cancer screening: If you are a current or former smoker age 50 to 80, ask your care team if ongoing lung cancer screenings are right for you.  For informational purposes only. Not to replace the advice of your health care provider. Copyright   2023 [employer]. All rights reserved. Clinically reviewed by the [facility]. [employer] 779923 - REV 01/24.    Learning About Stress  What is stress?     Stress is your  body's response to a hard situation. Your body can have a physical, emotional, or mental response. Stress is a fact of life for most people, and it affects everyone differently. What causes stress for you may not be stressful for someone else.  A lot of things can cause stress. You may feel stress when you go on a job interview, take a test, or run a race. This kind of short-term stress is normal and even useful. It can help you if you need to work hard or react quickly. For example, stress can help you finish an important job on time.  Long-term stress is caused by ongoing stressful situations or events. Examples of long-term stress include long-term health problems, ongoing problems at work, or conflicts in your family. Long-term stress can harm your health.  How does stress affect your health?  When you are stressed, your body responds as though you are in danger. It makes hormones that speed up your heart, make you breathe faster, and give you a burst of energy. This is called the fight-or-flight stress response. If the stress is over quickly, your body goes back to normal and no harm is done.  But if stress happens too often or lasts too long, it can have bad effects. Long-term stress can make you more likely to get sick, and it can make symptoms of some diseases worse. If you tense up when you are stressed, you may develop neck, shoulder, or low back pain. Stress is linked to high blood pressure and heart disease.  Stress also harms your emotional health. It can make you bearden, tense, or depressed. Your relationships may suffer, and you may not do well at work or school.  What can you do to manage stress?  You can try these things to help manage stress:   Do something active. Exercise or activity can help reduce stress. Walking is a great way to get started. Even everyday activities such as housecleaning or yard work can help.  Try yoga or mak chi. These techniques combine exercise and meditation. You may need  some training at first to learn them.  Do something you enjoy. For example, listen to music or go to a movie. Practice your hobby or do volunteer work.  Meditate. This can help you relax, because you are not worrying about what happened before or what may happen in the future.  Do guided imagery. Imagine yourself in any setting that helps you feel calm. You can use online videos, books, or a teacher to guide you.  Do breathing exercises. For example:  From a standing position, bend forward from the waist with your knees slightly bent. Let your arms dangle close to the floor.  Breathe in slowly and deeply as you return to a standing position. Roll up slowly and lift your head last.  Hold your breath for just a few seconds in the standing position.  Breathe out slowly and bend forward from the waist.  Let your feelings out. Talk, laugh, cry, and express anger when you need to. Talking with supportive friends or family, a counselor, or a aly leader about your feelings is a healthy way to relieve stress. Avoid discussing your feelings with people who make you feel worse.  Write. It may help to write about things that are bothering you. This helps you find out how much stress you feel and what is causing it. When you know this, you can find better ways to cope.  What can you do to prevent stress?  You might try some of these things to help prevent stress:  Manage your time. This helps you find time to do the things you want and need to do.  Get enough sleep. Your body recovers from the stresses of the day while you are sleeping.  Get support. Your family, friends, and community can make a difference in how you experience stress.  Limit your news feed. Avoid or limit time on social media or news that may make you feel stressed.  Do something active. Exercise or activity can help reduce stress. Walking is a great way to get started.  Where can you learn more?  Go to https://www.healthwise.net/patiented  Enter N032 in the  "search box to learn more about \"Learning About Stress.\"  Current as of: October 24, 2023               Content Version: 14.0    9769-7453 [employer].   Care instructions adapted under license by your healthcare professional. If you have questions about a medical condition or this instruction, always ask your healthcare professional. [employer] disclaims any warranty or liability for your use of this information.      Substance Use Disorder: Care Instructions  Overview     You can improve your life and health by stopping your use of alcohol or drugs. When you don't drink or use drugs, you may feel and sleep better. You may get along better with your family, friends, and coworkers. There are medicines and programs that can help with substance use disorder.  How can you care for yourself at home?  Here are some ways to help you stay sober and prevent relapse.  If you have been given medicine to help keep you sober or reduce your cravings, be sure to take it exactly as prescribed.  Talk to your doctor about programs that can help you stop using drugs or drinking alcohol.  Do not keep alcohol or drugs in your home.  Plan ahead. Think about what you'll say if other people ask you to drink or use drugs. Try not to spend time with people who drink or use drugs.  Use the time and money spent on drinking or drugs to do something that's important to you.  Preventing a relapse  Have a plan to deal with relapse. Learn to recognize changes in your thinking that lead you to drink or use drugs. Get help before you start to drink or use drugs again.  Try to stay away from situations, friends, or places that may lead you to drink or use drugs.  If you feel the need to drink alcohol or use drugs again, seek help right away. Call a trusted friend or family member. Some people get support from organizations such as Narcotics Anonymous or [employer] or from treatment facilities.  If you relapse, get help " as soon as you can. Some people make a plan with another person that outlines what they want that person to do for them if they relapse. The plan usually includes how to handle the relapse and who to notify in case of relapse.  Don't give up. Remember that a relapse doesn't mean that you have failed. Use the experience to learn the triggers that lead you to drink or use drugs. Then quit again. Recovery is a lifelong process. Many people have several relapses before they are able to quit for good.  Follow-up care is a key part of your treatment and safety. Be sure to make and go to all appointments, and call your doctor if you are having problems. It's also a good idea to know your test results and keep a list of the medicines you take.  When should you call for help?   Call 911  anytime you think you may need emergency care. For example, call if you or someone else:    Has overdosed or has withdrawal signs. Be sure to tell the emergency workers that you are or someone else is using or trying to quit using drugs. Overdose or withdrawal signs may include:  Losing consciousness.  Seizure.  Seeing or hearing things that aren't there (hallucinations).     Is thinking or talking about suicide or harming others.   Where to get help 24 hours a day, 7 days a week   If you or someone you know talks about suicide, self-harm, a mental health crisis, a substance use crisis, or any other kind of emotional distress, get help right away. You can:    Call the Suicide and Crisis Lifeline at 982.     Call 0-364-365-TALK (1-855.270.4883).     Text HOME to 134483 to access the Crisis Text Line.   Consider saving these numbers in your phone.  Go to PlateJoy[employer].org for more information or to chat online.  Call your doctor now or seek immediate medical care if:    You are having withdrawal symptoms. These may include nausea or vomiting, sweating, shakiness, and anxiety.   Watch closely for changes in your health, and be sure to contact  "your doctor if:    You have a relapse.     You need more help or support to stop.   Where can you learn more?  Go to https://www.metraTec.net/patiented  Enter H573 in the search box to learn more about \"Substance Use Disorder: Care Instructions.\"  Current as of: November 15, 2023               Content Version: 14.0    5127-0829 [employer].   Care instructions adapted under license by your healthcare professional. If you have questions about a medical condition or this instruction, always ask your healthcare professional. [employer] disclaims any warranty or liability for your use of this information.      Safer Sex: Care Instructions  Overview  Safer sex is a way to reduce your risk of getting a sexually transmitted infection (STI). It can also help prevent pregnancy.  Several products can help you practice safer sex and reduce your chance of STIs. One of the best is a condom. There are internal and external condoms. You can use a special rubber sheet (dental dam) for protection during oral sex. Disposable gloves can keep your hands from touching blood, semen, or other body fluids that can carry infections.  Remember that birth control methods such as diaphragms, IUDs, foams, and birth control pills do not stop you from getting STIs.  Follow-up care is a key part of your treatment and safety. Be sure to make and go to all appointments, and call your doctor if you are having problems. It's also a good idea to know your test results and keep a list of the medicines you take.  How can you care for yourself at home?  Think about getting vaccinated to help prevent hepatitis A, hepatitis B, and human papillomavirus (HPV). They can be spread through sex.  Use a condom every time you have sex. Use an external condom, which goes on the penis. Or use an internal condom, which goes into the vagina or anus.  Make sure you use the right size external condom. A condom that's too small can break " "easily. A condom that's too big can slip off during sex.  Use a new condom each time you have sex. Be careful not to poke a hole in the condom when you open the wrapper.  Don't use an internal condom and an external condom at the same time.  Never use petroleum jelly (such as Vaseline), grease, hand lotion, baby oil, or anything with oil in it. These products can make holes in the condom.  After intercourse, hold the edge of the condom as you remove it. This will help keep semen from spilling out of the condom.  Do not have sex with anyone who has symptoms of an STI, such as sores on the genitals or mouth.  Do not drink a lot of alcohol or use drugs before sex.  Limit your sex partners. Sex with one partner who has sex only with you can reduce your risk of getting an STI.  Don't share sex toys. But if you do share them, use a condom and clean the sex toys between each use.  Talk to any partners before you have sex. Talk about what you feel comfortable with and whether you have any boundaries with sex. And find out if your partner or partners may be at risk for any STI. Keep in mind that a person may be able to spread an STI even if they do not have symptoms. You and any partners may want to get tested for STIs.  Where can you learn more?  Go to https://www.[employer].net/patiented  Enter B608 in the search box to learn more about \"Safer Sex: Care Instructions.\"  Current as of: November 27, 2023               Content Version: 14.0    9008-3534 [employer].   Care instructions adapted under license by your healthcare professional. If you have questions about a medical condition or this instruction, always ask your healthcare professional. [employer] disclaims any warranty or liability for your use of this information.      "

## 2024-03-26 NOTE — PROGRESS NOTES
Preventive Care Visit  [facility] MALINA Christensen PA-C, Family Medicine  Mar 26, 2024      Assessment & Plan     Routine general medical examination at a health care facility    Decreased hearing of both ears  Plan for pt to follow up with audiologist regarding hearing issues. 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 Has worn ear protection.   - Adult Audiology  Referral    Skin lesion  Pt with a family hx of skin cancer. Plan for her to follow up with dermatology for a full body check. Recommend she keep the mole on her upper right arm moist and to try not to scratch the area.  - Adult Dermatology  Referral    Encounter for surveillance of vaginal ring hormonal contraceptive device  - etonogestrel-ethinyl estradiol (NUVARING) 0.12-0.015 MG/24HR vaginal ring  Dispense: 3 each; Refill: 3    Screen for STD (sexually transmitted disease)  Monitor results.   - NEISSERIA GONORRHOEA PCR  - CHLAMYDIA TRACHOMATIS PCR  - HIV Antigen Antibody Combo  - Hepatitis C Screen Reflex to HCV RNA Quant and Genotype      PCOS (polycystic ovarian syndrome)  - etonogestrel-ethinyl estradiol (NUVARING) 0.12-0.015 MG/24HR vaginal ring  Dispense: 3 each; Refill: 3    Screening for hyperlipidemia  Monitor labs   - Lipid panel reflex to direct LDL Fasting                Counseling  Appropriate preventive services were discussed with this patient, including applicable screening as appropriate for fall prevention, nutrition, physical activity, Tobacco-use cessation, weight loss and cognition.  Checklist reviewing preventive services available has been given to the patient.  Reviewed patient's diet, addressing concerns and/or questions.           Seble Sanchez is a 29 year old, presenting for the following:  Physical        3/26/2024     3:04 PM   Additional Questions   Roomed by An V.         3/26/2024     3:04 PM   Patient Reported Additional  Medications   Patient reports taking the following new medications none        Health Care Directive  Patient does not have a Health Care Directive or Living Will: Discussed advance care planning with patient; information given to patient to review.    HPI  Laura is overall doing well. She requested STD testing today. Reports new sexual partners. No issues with the Nuvaring She also reports some concerns for a few different moles. She has a family hx of skin cancer. She has not notcied if the mole has changes in size or color, but does report it is itchy at times. 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                 3/26/2024   General Health   How would you rate your overall physical health? Good   Feel stress (tense, anxious, or unable to sleep) To some extent   (!) STRESS CONCERN      3/26/2024   Nutrition   Three or more servings of calcium each day? Yes   Diet: Regular (no restrictions)   How many servings of fruit and vegetables per day? (!) 2-3   How many sweetened beverages each day? 0-1         3/26/2024   Exercise   Days per week of moderate/strenous exercise 6 days   Average minutes spent exercising at this level 40 min         3/26/2024   Social Factors   Frequency of gathering with friends or relatives More than three times a week   Worry food won't last until get money to buy more No   Food not last or not have enough money for food? No   Do you have housing?  No   Are you worried about losing your housing? No   Lack of transportation? No   Unable to get utilities (heat,electricity)? No   Want help with housing or utility concern? No   (!) HOUSING CONCERN PRESENT      3/26/2024   Dental   Dentist two times every year? Yes         3/26/2024   TB Screening   Were you born outside of the [place]? No         Today's PHQ-2 Score:       3/26/2024     "11:08 AM   PHQ-2 ( 1999 [employer])   Q1: Little interest or pleasure in doing things 1   Q2: Feeling down, depressed or hopeless 1   PHQ-2 Score 2   Q1: Little interest or pleasure in doing things Several days   Q2: Feeling down, depressed or hopeless Several days   PHQ-2 Score 2           3/26/2024   Substance Use   Alcohol more than 3/day or more than 7/wk No   Do you use any other substances recreationally? (!) ALCOHOL    (!) CANNABIS PRODUCTS     Social History     Tobacco Use    Smoking status: Passive Smoke Exposure - Never Smoker    Smokeless tobacco: Never   Vaping Use    Vaping Use: Never used   Substance Use Topics    Alcohol use: Yes     Comment: 2-4 drinks per week.    Drug use: Yes     Types: Marijuana             3/26/2024   Breast Cancer Screening   Family history of breast, colon, or ovarian cancer? No / Unknown              3/26/2024   STI Screening   New sexual partner(s) since last STI/HIV test? (!) YES      History of abnormal Pap smear: NO - age 21-29 PAP every 3 years recommended        1/27/2023     8:53 AM 9/24/2020     2:48 PM   PAP / HPV   PAP Negative for Intraepithelial Lesion or Malignancy (NILM)     PAP (Historical)  NIL            3/26/2024   Contraception/Family Planning   Questions about contraception or family planning No        Reviewed and updated as needed this visit by Provider                          Review of Systems  INTEGUMENTARY/SKIN: POSITIVE for mole changes on upper left arm   ENT/MOUTH: POSITIVE for mild hearing loss      Objective    Exam  /72   Pulse 58   Temp 97.8  F (36.6  C) (Oral)   Resp 12   Ht 1.694 m (5' 6.69\")   Wt 69.3 kg (152 lb 12.8 oz)   LMP 03/18/2024 (Exact Date)   SpO2 100%   BMI 24.15 kg/m     Estimated body mass index is 24.15 kg/m  as calculated from the following:    Height as of this encounter: 1.694 m (5' 6.69\").    Weight as of this encounter: 69.3 kg (152 lb 12.8 oz).    Physical Exam  GENERAL: alert and no distress  EYES: Eyes " grossly normal to inspection, PERRL and conjunctivae and sclerae normal  HENT: ear canals and TM's normal, nose and mouth without ulcers or lesions  NECK: no adenopathy, no asymmetry, masses, or scars  RESP: lungs clear to auscultation - no rales, rhonchi or wheezes  CV: regular rate and rhythm, normal S1 S2, no S3 or S4, no murmur, click or rub, no peripheral edema  ABDOMEN: soft, nontender, no hepatosplenomegaly, no masses   MS: no gross musculoskeletal defects noted, no edema  SKIN: no suspicious lesions or rashes, small raised mole -  left upper arm , and excoriations, scabs and bruising throughout arms and torso (these are from turf while playing Frisbee)         Signed Electronically by: NANCY Pina

## 2024-03-26 NOTE — COMMUNITY RESOURCES LIST (ENGLISH)
March 26, 2024           YOUR PERSONALIZED LIST OF SERVICES & PROGRAMS           & SHELTER    Housing      Free - Client Services  [address] [place] [address] (Distance: 8.0 miles)  Phone: [phone]  Website: https://[employer]Beat My Waste Quote/  Language: English  Fee: Free  Transportation Options: Free transportation      [facility] sn[facility]  Phone: [phone]  Website: https://www.Silverpop.org/  Language: English      [facility] - Housing Stabilization Services  Phone: (104) 757-8244  Website: https://Lifebooker.com/Housing-Stabilization.html  Language: English  Hours: Mon 9:00 AM - 5:00 PM Tue 9:00 AM - 5:00 PM Wed 9:00 AM - 5:00 PM Thu 9:00 AM - 5:00 PM Fri 9:00 AM - 5:00 PM  Fee: Insurance  Accessibility: Deaf or hard of hearing, Translation services    Case Management      Living - Housing Stabilization Services  [address] [place] [address] (Distance: 1.4 miles)  Phone: [phone]  Website: https://Pearlfection  Language: Croatian, English, Welsh  Fee: Insurance, Self pay      [employer] - Housing Stabilization Services  Phone: (231) [phone]  Website: https://homebasemn.com/  Language: English  Hours: Mon 8:00 AM - 4:00 PM Tue 8:00 AM - 4:00 PM Wed 8:00 AM - 4:00 PM Thu 8:00 AM - 4:00 PM Fri 8:00 AM - 4:00 PM  Fee: Free  Accessibility: Blind accommodation, Deaf or hard of hearing  Transportation Options: Free transportation      [facility] [employer] - Integrated Community Support Services (ICS) and Individualized Home Support (IHS)  Phone: (340) 334-1948  Website: https://www.[employer].org/  Language: Croatian, English, Uruguayan, Hmong, Welsh, Palauan  Hours: Mon 8:00 AM - 5:00 PM Tue 8:00 AM - 5:00 PM Wed 8:00 AM - 5:00 PM Thu 8:00 AM - 5:00 PM Fri 8:00 AM - 5:00 PM  Fee: Insurance  Accessibility: Blind accommodation, Deaf or hard of hearing, Translation services    Drop-In Services      [facility]  Library - Warming or cooling center - [facility]  [address] [place] [address] (Distance: 1.3 miles)  Language: English  Fee: [facility] Library - Warming or cooling center - [facility]  [address] [place] [address] (Distance: 0.4 miles)  Language: English, Luxembourgish  Fee: Free      LOVE - LAUNDRY LOVE  Website: http://www.laundrylove.org               IMPORTANT NUMBERS & WEBSITES        Emergency Services  911  .   United Way  211 http://211unitedway.org  .   Poison Control  (615) 714-4381 http://[place]poison.org http://wisconsinpoison.org  .     Suicide and Crisis Lifeline  988 http://988Mainstream Dataline.org  .   Childhelp [place] Child Abuse Hotline  343.797.4068 http://Childhelphotline.org   .   [place] Sexual Assault Hotline  (223) 883-5035 (HOPE) http://Soulstice Endeavorsn.org   .     [place] Runaway Safeline  (520) 344-8947 (RUNAWAY) http://iPowerUpruSimperium.org  .   Pregnancy & Postpartum Support  Call/text 930-718-5893  [place]: http://ppsupport[place].org  [place]: http://[employer] Services.com/wi  .   Substance Abuse National Helpline ([facility])  062-476-HELP (3870) http://Findtreatment.gov   .                DISCLAIMER: [employer] does not endorse any service providers mentioned in this resource list. [employer] does not guarantee that the services mentioned in this resource list will be available to you or will improve your health or wellness.    [facility]

## 2024-03-27 LAB
C TRACH DNA SPEC QL NAA+PROBE: NEGATIVE
CHOLEST SERPL-MCNC: 182 MG/DL
FASTING STATUS PATIENT QL REPORTED: NO
HCV AB SERPL QL IA: NONREACTIVE
HDLC SERPL-MCNC: 87 MG/DL
HIV 1+2 AB+HIV1 P24 AG SERPL QL IA: NONREACTIVE
LDLC SERPL CALC-MCNC: 79 MG/DL
N GONORRHOEA DNA SPEC QL NAA+PROBE: NEGATIVE
NONHDLC SERPL-MCNC: 95 MG/DL
TRIGL SERPL-MCNC: 79 MG/DL

## 2024-03-29 NOTE — PROGRESS NOTES
[facility] Dermatology Note  Encounter Date: May 10, 2023  Office Visit     Reviewed patients past medical history and pertinent chart review prior to patients visit today.     Dermatology Problem List:  Acne scarring.     ____________________________________________    Assessment & Plan:     # Acne scarring  - Recommended that she stop using the benzoyl peroxide wash and OTC retinol cream.  Recommended to avoid the sensitive areas with the adapalene gel.  Continue current skin care routine with addition of la roche posay vitamin c serum nightly.   -Continue adapalene 0.1% gel, avoiding the creases of nose, chin and around the eyes. Apply once every other day and increase to nightly as tolerate.  Waiting a few minutes after washing affected areas will decrease irritation. Method of application, side effects and expected results were discussed. The patient will apply pea size amount to the entire face, avoid areas around the eyes, corners of nose and mouth. Discussed side effects including photosensitivity and irritation. Do not apply to any areas she plans to do waxing or laser hair removal to for about least 4 weeks prior to procedures.   -moisturize with a cream such as Cetaphil cream, Cera Ve Cream or Vanicream nightly and more PRN for dryness.       Follow-up: 3 months for follow up if not well controlled. 1 year if well controlled on topical therapy only, sooner PRN new concerns.    Mervat Tian, CNP  Dermatology   _______________________________________    CC: Derm Problem    HPI:  Ms. Laura Nolan is a(n) 28 year old female who presents today as a new patient for acne scarring. Patient has acne scarring on her face.  She reports that she did not have intense acne during adolescence but after she was came off of birth control she got a lot of acne to the jaw line and cheeks.  She then went back onto birthcontrol and started a skin routine which did help her skin.  She will only have  GOALS:  LTG: Patient will maintain adequate hydration/nutrition with optimum safety and efficiency of swallowing function with PO intake without overt signs or symptoms of aspiration for the highest appropriate diet level.  STG:  Patient will consume soft and bite-sized textures and thin liquids without overt signs or symptoms of airway compromise.  Patient will safely ingest diet trials during therapeutic feedings with SLP without overt signs or symptoms of respiratory compromise in efforts to advance diet.    SPEECH LANGUAGE PATHOLOGY: DYSPHAGIA Initial Assessment    Acknowledge Order  I  Therapy Time  I   Charges     I  Rehab Caseload Tracker      NAME: Lilliam Song  : 1933  MRN: [id number]    ADMISSION DATE: 3/28/2024  PRIMARY DIAGNOSIS: Hypercalcemia    ICD-10: Treatment Diagnosis: R13.12 Dysphagia, Oropharyngeal Phase    RECOMMENDATIONS   Diet:    Soft and Bite-Sized  Thin Liquids    Medication: whole floated in puree or applesauce and crushed in puree or applesauce, as permitted by pharmacy   Compensatory Swallowing Strategies:   Alternate solids and liquids  Slow rate of intake  Small bites/sips  Upright as possible for all oral intake   Therapeutic Intervention:   Patient/family education  Dysphagia treatment   Patient continues to require skilled intervention:  Yes. Recommend ongoing speech therapy services during this hospitalization.     Anticipated Discharge Needs:  Daughter reports patient evaluated by home health ST 3/27/24, recommend patient receives continued speech therapy home health services post-acute      ASSESSMENT    Patient presents with mild-moderate oral phase dysphagia characterized by prolonged mastication. No overt indications of airway compromise observed this date. Patient/daughter opting for soft and bite sized solid diet.     Recommend soft and bite sized solids/thin liquids, meds as tolerated. ST will follow up to ensure diet tolerance.     GENERAL    Subjective: RN  occasional breakout and wants to focus on anti-aging treatments and scar treatment.  She is currently using Cetaphil gentle cleanser, CeraVe lotion, Neutrogena benzoyl peroxide 3.5% wash, and adapalene 0.1% gel but can only use the adapalene once weekly due to dryness around her nose/mouth area.  She does use a daily sunscreen which is marketed for acne prone skin.  She has tried an OTC retinol cream 1-2 times weekly and has noticed that this will cause breakouts and peeling to the sensitive areas of her face.  She reports that she does tan often and will try to apply sunscreen.  She is interested in receiving recommendations for hair removal.  She has previously done laser treatments for hair removal.    Patient is otherwise feeling well, without additional skin concerns.      Physical Exam:  Vitals: There were no vitals taken for this visit.   GENERAL APPEARANCE: Patient is awake, alert, and cooperative.  NAD.   SKIN: Acne exam, which includes the face, neck, upper central chest, and upper central back was performed.  - Postinflammatory erythema and scarring mostly to the cheeks and glabella.    - No other lesions of concern on areas examined.     Medications:  Current Outpatient Medications   Medication     etonogestrel-ethinyl estradiol (NUVARING) 0.12-0.015 MG/24HR vaginal ring     clindamycin (CLEOCIN T) 1 % external lotion     No current facility-administered medications for this visit.      Past Medical History:   Patient Active Problem List   Diagnosis     Hirsutism     PCOS (polycystic ovarian syndrome)     Acne, unspecified acne type     History reviewed. No pertinent past medical history.    CC Lisa Christensen PA-C  [address] [id number] on close of this encounter.

## 2024-08-06 ENCOUNTER — MYC MEDICAL ADVICE (OUTPATIENT)
Dept: FAMILY MEDICINE | Facility: CLINIC | Age: 29
End: 2024-08-06
Payer: COMMERCIAL

## 2024-08-07 ENCOUNTER — E-VISIT (OUTPATIENT)
Dept: FAMILY MEDICINE | Facility: CLINIC | Age: 29
End: 2024-08-07
Payer: COMMERCIAL

## 2024-08-07 DIAGNOSIS — H00.015 HORDEOLUM EXTERNUM OF LEFT LOWER EYELID: Primary | ICD-10-CM

## 2024-08-07 PROCEDURE — 99421 OL DIG E/M SVC 5-10 MIN: CPT | Performed by: PHYSICIAN ASSISTANT

## 2024-08-29 ENCOUNTER — OFFICE VISIT (OUTPATIENT)
Dept: OPTOMETRY | Facility: CLINIC | Age: 29
End: 2024-08-29
Attending: PHYSICIAN ASSISTANT
Payer: COMMERCIAL

## 2024-08-29 DIAGNOSIS — H00.019 HORDEOLUM EXTERNUM, UNSPECIFIED LATERALITY: Primary | ICD-10-CM

## 2024-08-29 PROCEDURE — 99243 OFF/OP CNSLTJ NEW/EST LOW 30: CPT | Performed by: OPTOMETRIST

## 2024-08-29 RX ORDER — ERYTHROMYCIN 5 MG/G
0.5 OINTMENT OPHTHALMIC AT BEDTIME
Qty: 3.5 G | Refills: 3 | Status: SHIPPED | OUTPATIENT
Start: 2024-08-29 | End: 2024-11-29

## 2024-08-29 ASSESSMENT — CONF VISUAL FIELD
OD_SUPERIOR_TEMPORAL_RESTRICTION: 0
OS_SUPERIOR_TEMPORAL_RESTRICTION: 0
OD_INFERIOR_NASAL_RESTRICTION: 0
OS_SUPERIOR_NASAL_RESTRICTION: 0
OD_NORMAL: 1
OS_INFERIOR_NASAL_RESTRICTION: 0
OS_NORMAL: 1
OD_INFERIOR_TEMPORAL_RESTRICTION: 0
OS_INFERIOR_TEMPORAL_RESTRICTION: 0
OD_SUPERIOR_NASAL_RESTRICTION: 0

## 2024-08-29 ASSESSMENT — SLIT LAMP EXAM - LIDS
COMMENTS: NORMAL
COMMENTS: BLEPHARITIS

## 2024-08-29 ASSESSMENT — VISUAL ACUITY
OS_SC: 20/20
METHOD: SNELLEN - LINEAR
OS_SC+: -2
OD_SC: 20/20

## 2024-08-29 ASSESSMENT — TONOMETRY
IOP_METHOD: APPLANATION
OS_IOP_MMHG: 12
OD_IOP_MMHG: 12

## 2024-08-29 ASSESSMENT — EXTERNAL EXAM - LEFT EYE: OS_EXAM: NORMAL

## 2024-08-29 ASSESSMENT — EXTERNAL EXAM - RIGHT EYE: OD_EXAM: NORMAL

## 2024-08-29 NOTE — PROGRESS NOTES
Chief Complaint   Patient presents with    Stye / Hordeolum Evaluation     Left lower lid stye since around April- it feels like it is going away and it isn't visible and then it'll flare back up and be visible. Sometimes it is super uncomfortable but at the moment it is not. Trying to keep the area clean and would wash it and use a hypochlorous acid solution on the eyelid. No report of vision being affected        Do you wear contact lenses? no        Denelle Gunner - Optometric Assistant     See Review Of Systems       Medical, surgical and family histories reviewed and updated 8/29/2024.         OBJECTIVE: See Ophthalmology exam    ASSESSMENT:    ICD-10-CM    1. Hordeolum externum, unspecified laterality  H00.019 Adult Eye  Referral     erythromycin (ROMYCIN) 5 MG/GM ophthalmic ointment         PLAN:    Patient Instructions   Ab ointment applied to the eyelids at night is to manage the symptoms of the eyelids experienced over the last 4 months.      RTC if symptoms worsen.    Indy Alonso O.D.  [address] [id number] [phone]

## 2024-08-29 NOTE — LETTER
8/29/2024      Laura Nolan  [address]  [facility] [address]      Dear Colleague,    Thank you for referring your patient, Laura Nolan, to the [facility]. Please see a copy of my visit note below.    Chief Complaint   Patient presents with     Stye / Hordeolum Evaluation     Left lower lid stye since around April- it feels like it is going away and it isn't visible and then it'll flare back up and be visible. Sometimes it is super uncomfortable but at the moment it is not. Trying to keep the area clean and would wash it and use a hypochlorous acid solution on the eyelid. No report of vision being affected        Do you wear contact lenses? no        Rome Martino - Optometric Assistant     See Review Of Systems       Medical, surgical and family histories reviewed and updated 8/29/2024.         OBJECTIVE: See Ophthalmology exam    ASSESSMENT:    ICD-10-CM    1. Hordeolum externum, unspecified laterality  H00.019 Adult Eye  Referral     erythromycin (ROMYCIN) 5 MG/GM ophthalmic ointment         PLAN:    Patient Instructions   Ab ointment applied to the eyelids at night is to manage the symptoms of the eyelids experienced over the last 4 months.      RTC if symptoms worsen.    Indy Alonso O.D.  [facility]   [address] [place] [address]    [phone]         Again, thank you for allowing me to participate in the care of your patient.        Sincerely,        Indy Alonso OD   none

## 2024-08-29 NOTE — PATIENT INSTRUCTIONS
Ab ointment applied to the eyelids at night is to manage the symptoms of the eyelids experienced over the last 4 months.      RTC if symptoms worsen.    Indy Alonso O.D.  [address] [address] [phone]

## 2025-01-27 ENCOUNTER — OFFICE VISIT (OUTPATIENT)
Dept: FAMILY MEDICINE | Facility: CLINIC | Age: 30
End: 2025-01-27
Payer: COMMERCIAL

## 2025-01-27 VITALS
WEIGHT: 160.8 LBS | HEIGHT: 67 IN | SYSTOLIC BLOOD PRESSURE: 114 MMHG | BODY MASS INDEX: 25.24 KG/M2 | DIASTOLIC BLOOD PRESSURE: 74 MMHG | RESPIRATION RATE: 12 BRPM | HEART RATE: 46 BPM | TEMPERATURE: 97.7 F | OXYGEN SATURATION: 100 %

## 2025-01-27 DIAGNOSIS — Z11.3 SCREEN FOR STD (SEXUALLY TRANSMITTED DISEASE): ICD-10-CM

## 2025-01-27 DIAGNOSIS — Z30.44 ENCOUNTER FOR SURVEILLANCE OF VAGINAL RING HORMONAL CONTRACEPTIVE DEVICE: ICD-10-CM

## 2025-01-27 DIAGNOSIS — E28.2 PCOS (POLYCYSTIC OVARIAN SYNDROME): ICD-10-CM

## 2025-01-27 DIAGNOSIS — Z00.00 ROUTINE GENERAL MEDICAL EXAMINATION AT A HEALTH CARE FACILITY: Primary | ICD-10-CM

## 2025-01-27 LAB
ERYTHROCYTE [DISTWIDTH] IN BLOOD BY AUTOMATED COUNT: 12.5 % (ref 10–15)
EST. AVERAGE GLUCOSE BLD GHB EST-MCNC: 100 MG/DL
HBA1C MFR BLD: 5.1 % (ref 0–5.6)
HCT VFR BLD AUTO: 40.2 % (ref 35–47)
HGB BLD-MCNC: 13.5 G/DL (ref 11.7–15.7)
MCH RBC QN AUTO: 31.5 PG (ref 26.5–33)
MCHC RBC AUTO-ENTMCNC: 33.6 G/DL (ref 31.5–36.5)
MCV RBC AUTO: 94 FL (ref 78–100)
PLATELET # BLD AUTO: 208 10E3/UL (ref 150–450)
RBC # BLD AUTO: 4.28 10E6/UL (ref 3.8–5.2)
WBC # BLD AUTO: 5.7 10E3/UL (ref 4–11)

## 2025-01-27 PROCEDURE — 86803 HEPATITIS C AB TEST: CPT | Performed by: PHYSICIAN ASSISTANT

## 2025-01-27 PROCEDURE — 87591 N.GONORRHOEAE DNA AMP PROB: CPT | Performed by: PHYSICIAN ASSISTANT

## 2025-01-27 PROCEDURE — 99395 PREV VISIT EST AGE 18-39: CPT | Performed by: PHYSICIAN ASSISTANT

## 2025-01-27 PROCEDURE — 86780 TREPONEMA PALLIDUM: CPT | Performed by: PHYSICIAN ASSISTANT

## 2025-01-27 PROCEDURE — G2211 COMPLEX E/M VISIT ADD ON: HCPCS | Performed by: PHYSICIAN ASSISTANT

## 2025-01-27 PROCEDURE — 87491 CHLMYD TRACH DNA AMP PROBE: CPT | Performed by: PHYSICIAN ASSISTANT

## 2025-01-27 PROCEDURE — 87389 HIV-1 AG W/HIV-1&-2 AB AG IA: CPT | Performed by: PHYSICIAN ASSISTANT

## 2025-01-27 PROCEDURE — 83036 HEMOGLOBIN GLYCOSYLATED A1C: CPT | Performed by: PHYSICIAN ASSISTANT

## 2025-01-27 PROCEDURE — 85027 COMPLETE CBC AUTOMATED: CPT | Performed by: PHYSICIAN ASSISTANT

## 2025-01-27 PROCEDURE — 36415 COLL VENOUS BLD VENIPUNCTURE: CPT | Performed by: PHYSICIAN ASSISTANT

## 2025-01-27 PROCEDURE — 99213 OFFICE O/P EST LOW 20 MIN: CPT | Mod: 25 | Performed by: PHYSICIAN ASSISTANT

## 2025-01-27 RX ORDER — ETONOGESTREL AND ETHINYL ESTRADIOL VAGINAL RING .015; .12 MG/D; MG/D
1 RING VAGINAL
Qty: 3 EACH | Refills: 3 | Status: SHIPPED | OUTPATIENT
Start: 2025-01-27

## 2025-01-27 SDOH — HEALTH STABILITY: PHYSICAL HEALTH: ON AVERAGE, HOW MANY MINUTES DO YOU ENGAGE IN EXERCISE AT THIS LEVEL?: 40 MIN

## 2025-01-27 SDOH — HEALTH STABILITY: PHYSICAL HEALTH: ON AVERAGE, HOW MANY DAYS PER WEEK DO YOU ENGAGE IN MODERATE TO STRENUOUS EXERCISE (LIKE A BRISK WALK)?: 6 DAYS

## 2025-01-27 ASSESSMENT — SOCIAL DETERMINANTS OF HEALTH (SDOH): HOW OFTEN DO YOU GET TOGETHER WITH FRIENDS OR RELATIVES?: THREE TIMES A WEEK

## 2025-01-27 NOTE — PATIENT INSTRUCTIONS
Patient Education   Preventive Care Advice   This is general advice given by our system to help you stay healthy. However, your care team may have specific advice just for you. Please talk to your care team about your preventive care needs.  Nutrition  Eat 5 or more servings of fruits and vegetables each day.  Try wheat bread, brown rice and whole grain pasta (instead of white bread, rice, and pasta).  Get enough calcium and vitamin D. Check the label on foods and aim for 100% of the RDA (recommended daily allowance).  Lifestyle  Exercise at least 150 minutes each week  (30 minutes a day, 5 days a week).  Do muscle strengthening activities 2 days a week. These help control your weight and prevent disease.  No smoking.  Wear sunscreen to prevent skin cancer.  Have a dental exam and cleaning every 6 months.  Yearly exams  See your health care team every year to talk about:  Any changes in your health.  Any medicines your care team has prescribed.  Preventive care, family planning, and ways to prevent chronic diseases.  Shots (vaccines)   HPV shots (up to age 26), if you've never had them before.  Hepatitis B shots (up to age 59), if you've never had them before.  COVID-19 shot: Get this shot when it's due.  Flu shot: Get a flu shot every year.  Tetanus shot: Get a tetanus shot every 10 years.  Pneumococcal, hepatitis A, and RSV shots: Ask your care team if you need these based on your risk.  Shingles shot (for age 50 and up)  General health tests  Diabetes screening:  Starting at age 35, Get screened for diabetes at least every 3 years.  If you are younger than age 35, ask your care team if you should be screened for diabetes.  Cholesterol test: At age 39, start having a cholesterol test every 5 years, or more often if advised.  Bone density scan (DEXA): At age 50, ask your care team if you should have this scan for osteoporosis (brittle bones).  Hepatitis C: Get tested at least once in your life.  STIs (sexually  transmitted infections)  Before age 24: Ask your care team if you should be screened for STIs.  After age 24: Get screened for STIs if you're at risk. You are at risk for STIs (including HIV) if:  You are sexually active with more than one person.  You don't use condoms every time.  You or a partner was diagnosed with a sexually transmitted infection.  If you are at risk for HIV, ask about PrEP medicine to prevent HIV.  Get tested for HIV at least once in your life, whether you are at risk for HIV or not.  Cancer screening tests  Cervical cancer screening: If you have a cervix, begin getting regular cervical cancer screening tests starting at age 21.  Breast cancer scan (mammogram): If you've ever had breasts, begin having regular mammograms starting at age 40. This is a scan to check for breast cancer.  Colon cancer screening: It is important to start screening for colon cancer at age 45.  Have a colonoscopy test every 10 years (or more often if you're at risk) Or, ask your provider about stool tests like a FIT test every year or Cologuard test every 3 years.  To learn more about your testing options, visit:   .  For help making a decision, visit:   https://bit.ly/bx96422.  Prostate cancer screening test: If you have a prostate, ask your care team if a prostate cancer screening test (PSA) at age 55 is right for you.  Lung cancer screening: If you are a current or former smoker ages 50 to 80, ask your care team if ongoing lung cancer screenings are right for you.  For informational purposes only. Not to replace the advice of your health care provider. Copyright   2023 [facility]. All rights reserved. Clinically reviewed by the [facility] Transitions Program. Charge Payment 703829 - REV 01/24.  Substance Use Disorder: Care Instructions  Overview     You can improve your life and health by stopping your use of alcohol or drugs. When you don't drink or use drugs, you may feel and sleep better. You may  get along better with your family, friends, and coworkers. There are medicines and programs that can help with substance use disorder.  How can you care for yourself at home?  Here are some ways to help you stay sober and prevent relapse.  If you have been given medicine to help keep you sober or reduce your cravings, be sure to take it exactly as prescribed.  Talk to your doctor about programs that can help you stop using drugs or drinking alcohol.  Do not keep alcohol or drugs in your home.  Plan ahead. Think about what you'll say if other people ask you to drink or use drugs. Try not to spend time with people who drink or use drugs.  Use the time and money spent on drinking or drugs to do something that's important to you.  Preventing a relapse  Have a plan to deal with relapse. Learn to recognize changes in your thinking that lead you to drink or use drugs. Get help before you start to drink or use drugs again.  Try to stay away from situations, friends, or places that may lead you to drink or use drugs.  If you feel the need to drink alcohol or use drugs again, seek help right away. Call a trusted friend or family member. Some people get support from organizations such as Narcotics Anonymous or [employer]2Care or from treatment facilities.  If you relapse, get help as soon as you can. Some people make a plan with another person that outlines what they want that person to do for them if they relapse. The plan usually includes how to handle the relapse and who to notify in case of relapse.  Don't give up. Remember that a relapse doesn't mean that you have failed. Use the experience to learn the triggers that lead you to drink or use drugs. Then quit again. Recovery is a lifelong process. Many people have several relapses before they are able to quit for good.  Follow-up care is a key part of your treatment and safety. Be sure to make and go to all appointments, and call your doctor if you are having problems. It's  "also a good idea to know your test results and keep a list of the medicines you take.  When should you call for help?   Call 911  anytime you think you may need emergency care. For example, call if you or someone else:    Has overdosed or has withdrawal signs. Be sure to tell the emergency workers that you are or someone else is using or trying to quit using drugs. Overdose or withdrawal signs may include:  Losing consciousness.  Seizure.  Seeing or hearing things that aren't there (hallucinations).     Is thinking or talking about suicide or harming others.   Where to get help 24 hours a day, 7 days a week   If you or someone you know talks about suicide, self-harm, a mental health crisis, a substance use crisis, or any other kind of emotional distress, get help right away. You can:    Call the Suicide and Crisis Lifeline at 988.     Call 0-480-252-TALK (1-863.213.2297).     Text HOME to 859202 to access the Crisis Text Line.   Consider saving these numbers in your phone.  Go to Fanzo for more information or to chat online.  Call your doctor now or seek immediate medical care if:    You are having withdrawal symptoms. These may include nausea or vomiting, sweating, shakiness, and anxiety.   Watch closely for changes in your health, and be sure to contact your doctor if:    You have a relapse.     You need more help or support to stop.   Where can you learn more?  Go to https://www.[employer]ribe Studios.net/patiented  Enter H573 in the search box to learn more about \"Substance Use Disorder: Care Instructions.\"  Current as of: November 15, 2023  Content Version: 14.3    2024 [employer].   Care instructions adapted under license by your healthcare professional. If you have questions about a medical condition or this instruction, always ask your healthcare professional. [employer] disclaims any warranty or liability for your use of this information.    Safer Sex: Care " Instructions  Overview  Safer sex is a way to reduce your risk of getting a sexually transmitted infection (STI). It can also help prevent pregnancy.  Several products can help you practice safer sex and reduce your chance of STIs. One of the best is a condom. There are internal and external condoms. You can use a special rubber sheet (dental dam) for protection during oral sex. Disposable gloves can keep your hands from touching blood, semen, or other body fluids that can carry infections.  Remember that birth control methods such as diaphragms, IUDs, foams, and birth control pills do not stop you from getting STIs.  Follow-up care is a key part of your treatment and safety. Be sure to make and go to all appointments, and call your doctor if you are having problems. It's also a good idea to know your test results and keep a list of the medicines you take.  How can you care for yourself at home?  Think about getting vaccinated to help prevent hepatitis A, hepatitis B, and human papillomavirus (HPV). They can be spread through sex.  Use a condom every time you have sex. Use an external condom, which goes on the penis. Or use an internal condom, which goes into the vagina or anus.  Make sure you use the right size external condom. A condom that's too small can break easily. A condom that's too big can slip off during sex.  Use a new condom each time you have sex. Be careful not to poke a hole in the condom when you open the wrapper.  Don't use an internal condom and an external condom at the same time.  Never use petroleum jelly (such as Vaseline), grease, hand lotion, baby oil, or anything with oil in it. These products can make holes in the condom.  After intercourse, hold the edge of the condom as you remove it. This will help keep semen from spilling out of the condom.  Do not have sex with anyone who has symptoms of an STI, such as sores on the genitals or mouth.  Do not drink a lot of alcohol or use drugs before  "sex.  Limit your sex partners. Sex with one partner who has sex only with you can reduce your risk of getting an STI.  Don't share sex toys. But if you do share them, use a condom and clean the sex toys between each use.  Talk to any partners before you have sex. Talk about what you feel comfortable with and whether you have any boundaries with sex. And find out if your partner or partners may be at risk for any STI. Keep in mind that a person may be able to spread an STI even if they do not have symptoms. You and any partners may want to get tested for STIs.  Where can you learn more?  Go to https://www.[employer].net/patiented  Enter B608 in the search box to learn more about \"Safer Sex: Care Instructions.\"  Current as of: April 30, 2024  Content Version: 14.3    2024 [employer].   Care instructions adapted under license by your healthcare professional. If you have questions about a medical condition or this instruction, always ask your healthcare professional. [employer] disclaims any warranty or liability for your use of this information.       "

## 2025-01-27 NOTE — PROGRESS NOTES
"Preventive Care Visit  [facility] MALINA Christensen PA-C, Family Medicine  Jan 27, 2025      Assessment & Plan     Routine general medical examination at a health care facility  Well adult.     PCOS (polycystic ovarian syndrome)  Refilled. Stable.   - etonogestrel-ethinyl estradiol (NUVARING) 0.12-0.015 MG/24HR vaginal ring; Place 1 each vaginally every 28 days.  - CBC with platelets; Future  - Hemoglobin A1c; Future  - CBC with platelets  - Hemoglobin A1c    Encounter for surveillance of vaginal ring hormonal contraceptive device  Refilled.   - etonogestrel-ethinyl estradiol (NUVARING) 0.12-0.015 MG/24HR vaginal ring; Place 1 each vaginally every 28 days.    Screen for STD (sexually transmitted disease)  Screen.  - NEISSERIA GONORRHOEA PCR; Future  - CHLAMYDIA TRACHOMATIS PCR; Future  - HIV Antigen Antibody Combo; Future  - Hepatitis C Screen Reflex to HCV RNA Quant and Genotype; Future  - Treponema Abs w Reflex to RPR and Titer; Future  - NEISSERIA GONORRHOEA PCR  - CHLAMYDIA TRACHOMATIS PCR  - HIV Antigen Antibody Combo  - Hepatitis C Screen Reflex to HCV RNA Quant and Genotype  - Treponema Abs w Reflex to RPR and Titer            BMI  Estimated body mass index is 25.54 kg/m  as calculated from the following:    Height as of this encounter: 1.69 m (5' 6.54\").    Weight as of this encounter: 72.9 kg (160 lb 12.8 oz).   Weight management plan: Discussed healthy diet and exercise guidelines    Counseling  Appropriate preventive services were addressed with this patient via screening, questionnaire, or discussion as appropriate for fall prevention, nutrition, physical activity, Tobacco-use cessation, social engagement, weight loss and cognition.  Checklist reviewing preventive services available has been given to the patient.  Reviewed patient's diet, addressing concerns and/or questions.     The longitudinal plan of care for the diagnosis(es)/condition(s) as documented were addressed during " this visit. Due to the added complexity in care, I will continue to support Laura in the subsequent management and with ongoing continuity of care.          Subjective   Laura is a 30 year old, presenting for the following:  Physical and Eye Problem (Stye on left eye, Chronic stye problems)        1/27/2025     3:41 PM   Additional Questions   Roomed by Taylor BHATTI.         1/27/2025     3:41 PM   Patient Reported Additional Medications   Patient reports taking the following new medications none          HPI    Doing well    Seeing eye for persistent stye issues in the left eye.    Still working at [employer].             Health Care Directive  Patient does not have a Health Care Directive: Discussed advance care planning with patient; information given to patient to review.      1/27/2025   General Health   How would you rate your overall physical health? Good   Feel stress (tense, anxious, or unable to sleep) Only a little   (!) STRESS CONCERN      1/27/2025   Nutrition   Three or more servings of calcium each day? Yes   Diet: Regular (no restrictions)   How many servings of fruit and vegetables per day? (!) 2-3   How many sweetened beverages each day? 0-1         1/27/2025   Exercise   Days per week of moderate/strenous exercise 6 days   Average minutes spent exercising at this level 40 min         1/27/2025   Social Factors   Frequency of gathering with friends or relatives Three times a week   Worry food won't last until get money to buy more No   Food not last or not have enough money for food? No   Do you have housing? (Housing is defined as stable permanent housing and does not include staying ouside in a car, in a tent, in an abandoned building, in an overnight shelter, or couch-surfing.) Yes   Are you worried about losing your housing? No   Lack of transportation? No   Unable to get utilities (heat,electricity)? No         1/27/2025   Dental   Dentist two times every year? Yes         3/26/2024   TB Screening  "  Were you born outside of the [place]? No         Today's PHQ-2 Score:       1/27/2025     2:23 PM   PHQ-2 ( 1999 [employer])   Q1: Little interest or pleasure in doing things 0   Q2: Feeling down, depressed or hopeless 0   PHQ-2 Score 0    Q1: Little interest or pleasure in doing things Not at all   Q2: Feeling down, depressed or hopeless Not at all   PHQ-2 Score 0       Patient-reported           1/27/2025   Substance Use   Alcohol more than 3/day or more than 7/wk No   Do you use any other substances recreationally? (!) CANNABIS PRODUCTS     Social History     Tobacco Use    Smoking status: Passive Smoke Exposure - Never Smoker     Passive exposure: Yes    Smokeless tobacco: Never   Vaping Use    Vaping status: Never Used   Substance Use Topics    Alcohol use: Yes     Comment: 2-4 drinks per week.    Drug use: Yes     Types: Marijuana                  1/27/2025   STI Screening   New sexual partner(s) since last STI/HIV test? (!) YES      History of abnormal Pap smear: No - age 21-29 PAP every 3 years recommended        1/27/2023     8:53 AM 9/24/2020     2:48 PM   PAP / HPV   PAP Negative for Intraepithelial Lesion or Malignancy (NILM)     PAP (Historical)  NIL            1/27/2025   Contraception/Family Planning   Questions about contraception or family planning No        Reviewed and updated as needed this visit by Provider                          Review of Systems  Constitutional, neuro, ENT, endocrine, pulmonary, cardiac, gastrointestinal, genitourinary, musculoskeletal, integument and psychiatric systems are negative, except as otherwise noted.     Objective    Exam  /74   Pulse (!) 46   Temp 97.7  F (36.5  C) (Temporal)   Resp 12   Ht 1.69 m (5' 6.54\")   Wt 72.9 kg (160 lb 12.8 oz)   LMP 01/04/2025 (Approximate)   SpO2 100%   BMI 25.54 kg/m     Estimated body mass index is 25.54 kg/m  as calculated from the following:    Height as of this encounter: 1.69 m (5' 6.54\").    Weight as of this encounter: " 72.9 kg (160 lb 12.8 oz).    Physical Exam  GENERAL: alert and no distress  EYES: Eyes grossly normal to inspection, PERRL and conjunctivae and sclerae normal  HENT: ear canals and TM's normal, nose and mouth without ulcers or lesions  NECK: no adenopathy, no asymmetry, masses, or scars  RESP: lungs clear to auscultation - no rales, rhonchi or wheezes  CV: regular rate and rhythm, normal S1 S2, no S3 or S4, no murmur, click or rub, no peripheral edema  ABDOMEN: soft, nontender, no hepatosplenomegaly, no masses and bowel sounds normal  MS: no gross musculoskeletal defects noted, no edema  SKIN: no suspicious lesions or rashes  NEURO: Normal strength and tone, mentation intact and speech normal  PSYCH: mentation appears normal, affect normal/bright        Signed Electronically by: Lisa Christensen PA-C

## 2025-01-28 LAB
HCV AB SERPL QL IA: NONREACTIVE
HIV 1+2 AB+HIV1 P24 AG SERPL QL IA: NONREACTIVE
T PALLIDUM AB SER QL: NONREACTIVE

## 2025-01-29 LAB
C TRACH DNA SPEC QL NAA+PROBE: NEGATIVE
N GONORRHOEA DNA SPEC QL NAA+PROBE: NEGATIVE
SPECIMEN TYPE: NORMAL
SPECIMEN TYPE: NORMAL

## 2025-03-23 ENCOUNTER — MYC MEDICAL ADVICE (OUTPATIENT)
Dept: FAMILY MEDICINE | Facility: CLINIC | Age: 30
End: 2025-03-23
Payer: COMMERCIAL

## 2025-03-23 DIAGNOSIS — H00.015 HORDEOLUM EXTERNUM OF LEFT LOWER EYELID: Primary | ICD-10-CM

## 2025-05-01 ENCOUNTER — OFFICE VISIT (OUTPATIENT)
Dept: OPHTHALMOLOGY | Facility: CLINIC | Age: 30
End: 2025-05-01
Attending: PHYSICIAN ASSISTANT
Payer: COMMERCIAL

## 2025-05-01 DIAGNOSIS — H00.015 HORDEOLUM EXTERNUM OF LEFT LOWER EYELID: ICD-10-CM

## 2025-05-01 DIAGNOSIS — B88.0 INFESTATION BY DEMODEX: ICD-10-CM

## 2025-05-01 DIAGNOSIS — H01.00B BLEPHARITIS OF UPPER AND LOWER EYELIDS OF BOTH EYES, UNSPECIFIED TYPE: Primary | ICD-10-CM

## 2025-05-01 DIAGNOSIS — H01.00A BLEPHARITIS OF UPPER AND LOWER EYELIDS OF BOTH EYES, UNSPECIFIED TYPE: Primary | ICD-10-CM

## 2025-05-01 RX ORDER — NEOMYCIN SULFATE, POLYMYXIN B SULFATE, AND DEXAMETHASONE 3.5; 10000; 1 MG/G; [USP'U]/G; MG/G
0.5 OINTMENT OPHTHALMIC 3 TIMES DAILY
Qty: 3.5 G | Refills: 1 | Status: SHIPPED | OUTPATIENT
Start: 2025-05-01 | End: 2025-05-01

## 2025-05-01 RX ORDER — LOTILANER OPHTHALMIC SOLUTION 2.5 MG/ML
1 SOLUTION/ DROPS OPHTHALMIC 2 TIMES DAILY
Qty: 10 ML | Refills: 0 | OUTPATIENT
Start: 2025-05-01

## 2025-05-01 RX ORDER — NEOMYCIN SULFATE, POLYMYXIN B SULFATE, AND DEXAMETHASONE 3.5; 10000; 1 MG/G; [USP'U]/G; MG/G
0.5 OINTMENT OPHTHALMIC 3 TIMES DAILY
Qty: 3.5 G | Refills: 1 | Status: SHIPPED | OUTPATIENT
Start: 2025-05-01

## 2025-05-01 ASSESSMENT — VISUAL ACUITY
OS_SC: 20/20
OD_SC: 20/20
METHOD: SNELLEN - LINEAR
OS_SC+: -1

## 2025-05-01 ASSESSMENT — TONOMETRY
OD_IOP_MMHG: 17
IOP_METHOD: ICARE
OS_IOP_MMHG: 12

## 2025-05-01 ASSESSMENT — CONF VISUAL FIELD
OD_SUPERIOR_TEMPORAL_RESTRICTION: 0
OS_INFERIOR_NASAL_RESTRICTION: 0
METHOD: COUNTING FINGERS
OS_INFERIOR_TEMPORAL_RESTRICTION: 0
OD_INFERIOR_TEMPORAL_RESTRICTION: 0
OD_SUPERIOR_NASAL_RESTRICTION: 0
OS_SUPERIOR_TEMPORAL_RESTRICTION: 0
OD_INFERIOR_NASAL_RESTRICTION: 0
OS_NORMAL: 1
OS_SUPERIOR_NASAL_RESTRICTION: 0
OD_NORMAL: 1

## 2025-05-01 ASSESSMENT — EXTERNAL EXAM - LEFT EYE: OS_EXAM: NORMAL

## 2025-05-01 ASSESSMENT — CUP TO DISC RATIO
OD_RATIO: 0.3
OS_RATIO: 0.3

## 2025-05-01 ASSESSMENT — EXTERNAL EXAM - RIGHT EYE: OD_EXAM: NORMAL

## 2025-05-01 NOTE — PROGRESS NOTES
HPI       Follow Up    Since onset it is stable.  Associated symptoms include itching.  Negative for eye pain.  Treatments tried include no treatments.             Comments    Pt reports her hordeolum is persistent, and has 2 persistent hard spots in her lower eyelid. Itchy, uncomfortable but not painful. She's had the bumps for about 10 months.     She was using a warm compress, using the Thermapearl mask, and was using erythromycin ointment nightly for 5-6 months and it has not been improving. She stopped about 2 weeks ago.     Ivana Ortega OA 1:47 PM May 1, 2025             Last edited by Ivana Ortega on 5/1/2025  1:47 PM.          Review of systems for the eyes was negative other than the pertinent positives/negatives listed in the HPI.      Assessment & Plan      Laura Nolan is a 30 year old female with the following diagnoses:   1. Blepharitis of upper and lower eyelids of both eyes, unspecified type    2. Hordeolum externum of left lower eyelid       Patient reports she has been being treated by optometrist for hordeolum and using EE mitchell and heat mask 1-2 times per day most days  Hordeolum x2 on left lower lid have been there for approximately 10 months  Not red/inflamed/tender today - given chronicity, likely scarred  MGD U & L each eye + blepharitis/scurf/collarette worse UL OU  Discussed natural hx of MGD/blepharitis/hordeolum  Discussed options for treatment including I&C, continued heat/hygiene, Xdemvy, doxycycline    Plan:  -schedule for I&C of hordeolum  -Continue daily heat masks x2  -Continue every other day eyelid hygiene  -Start maxitrol ointment TID left eye  -Start xdemvy gtts twice a day both eyes x 6 weeks     Patient disposition:   Return in about 2 weeks (around 5/15/2025) for Dr. Zafar Procedure visit - I&C Hordeolum.    Damian Gayle MD  Resident Physician, PGY-2  Department of Ophthalmology  May 1, 2025            Attending Physician Attestation:  Complete  documentation of historical and exam elements from today's encounter can be found in the full encounter summary report (not reduplicated in this progress note).  I personally obtained the chief complaint(s) and history of present illness.  I confirmed and edited as necessary the review of systems, past medical/surgical history, family history, social history, and examination findings as documented by others; and I examined the patient myself.  I personally reviewed the relevant tests, images, and reports as documented above.  I formulated and edited as necessary the assessment and plan and discussed the findings and management plan with the patient and family. . - Catracho Zafar MD

## 2025-05-01 NOTE — NURSING NOTE
Chief Complaints and History of Present Illnesses   Patient presents with    Follow Up     Chief Complaint(s) and History of Present Illness(es)       Follow Up              Course: stable    Associated symptoms: itching.  Negative for eye pain    Treatments tried: no treatments              Comments    Pt reports her hordeolum is persistent, and has 2 persistent hard spots in her lower eyelid. Itchy, uncomfortable but not painful. She's had the bumps for about 10 months.     She was using a warm compress, using the Thermapearl mask, and was using erythromycin ointment nightly for 5-6 months and it has not been improving. She stopped about 2 weeks ago.     Ivana Ortega OA 1:47 PM May 1, 2025

## 2025-05-01 NOTE — PATIENT INSTRUCTIONS
Continue to do daily heat treatments of your eyes 1-2 times per day for approximately 10 minutes  Continue every other day lid scrubs with J&J Baby shampoo or over the counter eyelid cleansing wipes  Start maxitrol ointment applied to the hordeolum three times per day on both eyelids  Start xdemvy eyedrops - you will be contacted by the pharmacy for details regarding ordering and insurance coverage

## 2025-05-14 ENCOUNTER — OFFICE VISIT (OUTPATIENT)
Dept: OPHTHALMOLOGY | Facility: CLINIC | Age: 30
End: 2025-05-14
Attending: OPHTHALMOLOGY
Payer: COMMERCIAL

## 2025-05-14 DIAGNOSIS — H00.15 CHALAZION OF LEFT LOWER EYELID: Primary | ICD-10-CM

## 2025-05-14 PROCEDURE — 250N000009 HC RX 250: Performed by: OPHTHALMOLOGY

## 2025-05-14 PROCEDURE — 67800 REMOVE EYELID LESION: CPT | Performed by: OPHTHALMOLOGY

## 2025-05-14 RX ORDER — NEOMYCIN SULFATE, POLYMYXIN B SULFATE, AND DEXAMETHASONE 3.5; 10000; 1 MG/G; [USP'U]/G; MG/G
OINTMENT OPHTHALMIC ONCE
Status: COMPLETED | OUTPATIENT
Start: 2025-05-14 | End: 2025-05-14

## 2025-05-14 RX ADMIN — NEOMYCIN AND POLYMYXIN B SULFATES AND DEXAMETHASONE: 3.5; 10000; 1 OINTMENT OPHTHALMIC at 09:27

## 2025-05-14 ASSESSMENT — TONOMETRY
OS_IOP_MMHG: 12
OD_IOP_MMHG: 14
IOP_METHOD: TONOPEN

## 2025-05-14 ASSESSMENT — EXTERNAL EXAM - RIGHT EYE: OD_EXAM: NORMAL

## 2025-05-14 ASSESSMENT — EXTERNAL EXAM - LEFT EYE: OS_EXAM: NORMAL

## 2025-05-14 ASSESSMENT — VISUAL ACUITY
OD_SC+: -1
OS_SC: 20/20
OS_SC+: -1
OD_SC: 20/20
METHOD: SNELLEN - LINEAR

## 2025-05-14 ASSESSMENT — CONF VISUAL FIELD
OS_INFERIOR_NASAL_RESTRICTION: 0
OD_INFERIOR_TEMPORAL_RESTRICTION: 0
OD_SUPERIOR_NASAL_RESTRICTION: 0
OD_INFERIOR_NASAL_RESTRICTION: 0
OS_SUPERIOR_NASAL_RESTRICTION: 0
OS_SUPERIOR_TEMPORAL_RESTRICTION: 0
METHOD: COUNTING FINGERS
OD_SUPERIOR_TEMPORAL_RESTRICTION: 0
OS_NORMAL: 1
OD_NORMAL: 1
OS_INFERIOR_TEMPORAL_RESTRICTION: 0

## 2025-05-14 ASSESSMENT — CUP TO DISC RATIO
OD_RATIO: 0.3
OS_RATIO: 0.3

## 2025-05-14 NOTE — NURSING NOTE
Chief Complaints and History of Present Illnesses   Patient presents with    Procedure     Chief Complaint(s) and History of Present Illness(es)       Procedure               Comments    Laura is here for removal of hordeolum on LLL. She states little change since starting the steroid given to her.    Manjeet Awan COT 8:36 AM May 14, 2025

## 2025-05-14 NOTE — PROGRESS NOTES
HPI    Laura is here for removal of hordeolum on LLL. She states little change since starting the steroid given to her.    Manjeet Awan COT 8:36 AM May 14, 2025     Last edited by Manjeet Awan on 5/14/2025  8:36 AM.          Review of systems for the eyes was negative other than the pertinent positives/negatives listed in the HPI.      Assessment & Plan      Laura Nolan is a 30 year old female with the following diagnoses:   1. Chalazion of left lower eyelid         Here for chalazion incision and drainage left lower lid   RBA discussed, consent obtained, will proceed today.   Recommend cool compresses for 24 hours, then resume warm compresses   Maxitrol mitchell up to three times a day as needed for 1 week, then resume at bedtime for 1 month  Continue xdemvy twice a day for 6 week course  Return precautions reviewed     Patient disposition:   Return in about 6 weeks (around 6/25/2025), or if symptoms worsen or fail to improve, for VT only.           Attending Physician Attestation:  Complete documentation of historical and exam elements from today's encounter can be found in the full encounter summary report (not reduplicated in this progress note).  I personally obtained the chief complaint(s) and history of present illness.  I confirmed and edited as necessary the review of systems, past medical/surgical history, family history, social history, and examination findings as documented by others; and I examined the patient myself.  I personally reviewed the relevant tests, images, and reports as documented above.  I formulated and edited as necessary the assessment and plan and discussed the findings and management plan with the patient and family. . - Catracho Zafar MD

## 2025-07-21 ENCOUNTER — MYC MEDICAL ADVICE (OUTPATIENT)
Dept: FAMILY MEDICINE | Facility: CLINIC | Age: 30
End: 2025-07-21
Payer: COMMERCIAL

## 2025-07-22 NOTE — TELEPHONE ENCOUNTER
Huddled with the provider. Ok to schedule based on order placed for DTAP.      Jul 24, 2025 3:30 PM  MA Visit with BHARGAV NAYLOR/LPN  [facility] ([facility]) [address]  [place] [address]  [phone]         Karla Bueno,      Please advise.

## 2025-07-22 NOTE — TELEPHONE ENCOUNTER
Huddle with Team MA.    They advised sending this message to the Provider    [facility] says patient is saying the earliest done could be on 9/24/2025, though is not due until 9/24/2030.    Last given: 9/24/2020

## 2025-07-24 ENCOUNTER — ALLIED HEALTH/NURSE VISIT (OUTPATIENT)
Dept: FAMILY MEDICINE | Facility: CLINIC | Age: 30
End: 2025-07-24
Payer: COMMERCIAL

## 2025-07-24 DIAGNOSIS — Z23 ENCOUNTER FOR IMMUNIZATION: Primary | ICD-10-CM

## 2025-07-24 NOTE — TELEPHONE ENCOUNTER
"Please advise. Checked CDC website and it is generic, stating that adults should be \"up to date\" on whooping cough vaccine. She last had Tdap vaccine in 2020. Does she need another one?    Cecilia Trujillo RN   "

## 2025-07-24 NOTE — PROGRESS NOTES
Prior to immunization administration, verified patients identity using patient s name and date of birth. Please see Immunization Activity for additional information.     Is the patient's temperature normal (100.4 or less)? Yes     Patient MEETS CRITERIA. PROCEED with vaccine administration.      Patient instructed to remain in clinic for 15 minutes afterwards, and to report any adverse reactions.      Link to Ancillary Visit Immunization Standing Orders SmartSet     Screening performed by Belen Benjamin CMA on 7/24/2025 at 3:48 PM.